# Patient Record
Sex: FEMALE | Race: WHITE | NOT HISPANIC OR LATINO | Employment: FULL TIME | URBAN - METROPOLITAN AREA
[De-identification: names, ages, dates, MRNs, and addresses within clinical notes are randomized per-mention and may not be internally consistent; named-entity substitution may affect disease eponyms.]

---

## 2017-01-16 RX ORDER — OMEPRAZOLE 40 MG/1
CAPSULE, DELAYED RELEASE ORAL
Qty: 90 CAP | Refills: 0 | Status: SHIPPED | OUTPATIENT
Start: 2017-01-16 | End: 2017-03-15 | Stop reason: SDUPTHER

## 2017-01-16 NOTE — TELEPHONE ENCOUNTER
Was the patient seen in the last year in this department? No    Does patient have an active prescription for medications requested? No     Received Request Via: Pharmacy

## 2017-03-15 ENCOUNTER — OFFICE VISIT (OUTPATIENT)
Dept: MEDICAL GROUP | Facility: CLINIC | Age: 32
End: 2017-03-15
Payer: COMMERCIAL

## 2017-03-15 VITALS
RESPIRATION RATE: 14 BRPM | WEIGHT: 144 LBS | BODY MASS INDEX: 21.33 KG/M2 | DIASTOLIC BLOOD PRESSURE: 60 MMHG | SYSTOLIC BLOOD PRESSURE: 98 MMHG | HEART RATE: 41 BPM | TEMPERATURE: 98.6 F | OXYGEN SATURATION: 100 % | HEIGHT: 69 IN

## 2017-03-15 DIAGNOSIS — F41.9 ANXIETY: ICD-10-CM

## 2017-03-15 DIAGNOSIS — R00.1 BRADYCARDIA, SINUS: ICD-10-CM

## 2017-03-15 DIAGNOSIS — M22.2X9 PATELLOFEMORAL PAIN SYNDROME, UNSPECIFIED LATERALITY: ICD-10-CM

## 2017-03-15 DIAGNOSIS — K21.9 GERD WITHOUT ESOPHAGITIS: ICD-10-CM

## 2017-03-15 PROCEDURE — 99214 OFFICE O/P EST MOD 30 MIN: CPT | Performed by: NURSE PRACTITIONER

## 2017-03-15 RX ORDER — OMEPRAZOLE 40 MG/1
40 CAPSULE, DELAYED RELEASE ORAL
Qty: 90 CAP | Refills: 3 | Status: SHIPPED | OUTPATIENT
Start: 2017-03-15 | End: 2018-05-08 | Stop reason: SDUPTHER

## 2017-03-15 RX ORDER — DIAZEPAM 5 MG/1
5 TABLET ORAL
Qty: 30 TAB | Refills: 0 | Status: SHIPPED
Start: 2017-03-15 | End: 2018-07-10 | Stop reason: SDUPTHER

## 2017-03-15 ASSESSMENT — PATIENT HEALTH QUESTIONNAIRE - PHQ9: CLINICAL INTERPRETATION OF PHQ2 SCORE: 0

## 2017-03-15 NOTE — PROGRESS NOTES
"CC: Follow-Up        HPI:     Gogo presents today for the followin. Anxiety  Patient will like refills of her diazepam. Uses extremely rarely. States she thinks the pill she has manic she be . 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     2. GERD without esophagitis  Needs refills of her omeprazole. Does not take it improvement with over-the-counter omeprazole    4. Patellofemoral pain syndrome, unspecified laterality  Like refills of the cough neck which uses intermittently.    Current Outpatient Prescriptions   Medication Sig Dispense Refill   • omeprazole (PRILOSEC) 40 MG delayed-release capsule Take 1 Cap by mouth every day. 90 Cap 3   • diazepam (VALIUM) 5 MG Tab Take 1 Tab by mouth at bedtime as needed for Sleep. 30 Tab 0   • Diclofenac Sodium (VOLTAREN) 1 % Gel Apply 4 g to affected area(s) 4 times a day as needed. 1 Tube 1   • propranolol (INDERAL) 20 MG Tab   5   • ibuprofen (MOTRIN) 600 MG Tab Take 600 mg by mouth every 6 hours as needed.       No current facility-administered medications for this visit.     Social History   Substance Use Topics   • Smoking status: Former Smoker   • Smokeless tobacco: Never Used   • Alcohol Use: 4.2 oz/week     7 Shots of liquor per week     I reviewed patients allergies, problem list and medications today in [facility].    ROS: Any/all pertinent positives listed in the HPI, otherwise all others reviewed are negative today.      BP 98/60 mmHg  Pulse 41  Temp(Src) 37 °C (98.6 °F)  Resp 14  Ht 1.753 m (5' 9.02\")  Wt 65.318 kg (144 lb)  BMI 21.26 kg/m2  SpO2 100%  LMP 2017  Breastfeeding? No    Exam  Gen: Alert and oriented, No apparent distress. WDWN  Psych: A+Ox3, normal affect and mood  Skin: Warm, dry and intact. Good turgor   No rashes in visible areas.  Eye: Conjunctiva clear, lids normal  ENMT: Lips without lesions, good dentition  Lungs: Clear to " auscultation bilaterally, no rales or rhonchi   Unlabored respiratory effort.   CV: Regular rate and rhythm, S1, S2. No murmurs.   No Edema resting heart rate in the 40s today. Patient is asymptomatic.         Assessment and Plan.   31 y.o. female with the following issues.    1. Anxiety   reviewed from state pharmacy database-Medications found to be medically necessary/appropriate.    - diazepam (VALIUM) 5 MG Tab; Take 1 Tab by mouth at bedtime as needed for Sleep.  Dispense: 30 Tab; Refill: 0    2. Bradycardia, sinus  Stable. Chronically intermittent She is asymptomatic. Patient is 1:30 to 50 miles each week. She states that she is prone to bradycardia since she has started this habit years previously. She states she will often monitor heart rate and get numbers similar to the 40s for her heart rate sometimes lower.  She's never had any sequelae related to this. 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 She verbalized understanding.    3. GERD without esophagitis  Stable. Continue medication, has refills  - omeprazole (PRILOSEC) 40 MG delayed-release capsule; Take 1 Cap by mouth every day.  Dispense: 90 Cap; Refill: 3    4. Patellofemoral pain syndrome, unspecified laterality  Stable. Continue medication, has refills  - Diclofenac Sodium (VOLTAREN) 1 % Gel; Apply 4 g to affected area(s) 4 times a day as needed.  Dispense: 1 Tube; Refill: 1

## 2017-11-06 ENCOUNTER — OFFICE VISIT (OUTPATIENT)
Dept: MEDICAL GROUP | Facility: CLINIC | Age: 32
End: 2017-11-06
Payer: COMMERCIAL

## 2017-11-06 VITALS
HEART RATE: 59 BPM | DIASTOLIC BLOOD PRESSURE: 64 MMHG | BODY MASS INDEX: 21.48 KG/M2 | SYSTOLIC BLOOD PRESSURE: 108 MMHG | HEIGHT: 69 IN | TEMPERATURE: 100 F | RESPIRATION RATE: 14 BRPM | OXYGEN SATURATION: 99 % | WEIGHT: 145 LBS

## 2017-11-06 DIAGNOSIS — F41.8 PERFORMANCE ANXIETY: ICD-10-CM

## 2017-11-06 DIAGNOSIS — K21.9 GERD WITHOUT ESOPHAGITIS: ICD-10-CM

## 2017-11-06 DIAGNOSIS — Z11.59 ENCOUNTER FOR HEPATITIS C SCREENING TEST FOR LOW RISK PATIENT: ICD-10-CM

## 2017-11-06 DIAGNOSIS — Z13.220 SCREENING, LIPID: ICD-10-CM

## 2017-11-06 DIAGNOSIS — Z00.00 ROUTINE GENERAL MEDICAL EXAMINATION AT A HEALTH CARE FACILITY: ICD-10-CM

## 2017-11-06 PROCEDURE — 99395 PREV VISIT EST AGE 18-39: CPT | Performed by: NURSE PRACTITIONER

## 2017-11-06 RX ORDER — PROPRANOLOL HYDROCHLORIDE 20 MG/1
20 TABLET ORAL
Qty: 60 TAB | Refills: 0 | Status: SHIPPED | OUTPATIENT
Start: 2017-11-06 | End: 2018-07-09 | Stop reason: SDUPTHER

## 2017-11-07 NOTE — PROGRESS NOTES
"CC: Annual Exam (Without PAP; )        HPI:     Gogo presents today for the followin. Routine general medical examination at a health care facility  Here for routine physical. No complaints. 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 Performance anxiety  She does do best if she has propranolol to use as needed for speech is in presentations. We had held this at the last appointment because her heart rate was in the 40s. She's never had any problems with orthostatic hypotension, sinus bradycardia or syncope. She like to have medication prescribed again is able    3. GERD without esophagitis  Compliant with omeprazole. She is trying to wean off of it. She does not feel she is at risk for H. pylori        Current Outpatient Prescriptions   Medication Sig Dispense Refill   • propranolol (INDERAL) 20 MG Tab Take 1 Tab by mouth 1 time daily as needed. 60 Tab 0   • omeprazole (PRILOSEC) 40 MG delayed-release capsule Take 1 Cap by mouth every day. 90 Cap 3   • diazepam (VALIUM) 5 MG Tab Take 1 Tab by mouth at bedtime as needed for Sleep. 30 Tab 0   • ibuprofen (MOTRIN) 600 MG Tab Take 600 mg by mouth every 6 hours as needed.       No current facility-administered medications for this visit.      Social History   Substance Use Topics   • Smoking status: Former Smoker   • Smokeless tobacco: Never Used   • Alcohol use 3.0 - 4.2 oz/week     5 - 7 Shots of liquor per week      Comment: Socially, at most 5-7 weekly     I reviewed patients allergies, problem list and medications today in [facility].    ROS: Any/all pertinent positives listed in the HPI, otherwise all others reviewed are negative today.      /64   Pulse (!) 59   Temp 37.8 °C (100 °F)   Resp 14   Ht 1.753 m (5' 9\")   Wt 65.8 kg (145 lb)   LMP 2017   SpO2 99%   Breastfeeding? No   BMI 21.41 kg/m²     Exam:   Gen: Alert and oriented, No apparent distress. WDWN  Psych: A+Ox3, normal affect and mood  Skin: " Warm, dry and intact. Good turgor   No rashes in visible areas.  Eye: Conjunctiva clear, lids normal  ENMT: Lips without lesions, good dentition  Neck: No Lymphadenopathy, Thyromegaly, Bruits.   Trachea midline, no masses  Lungs: Clear to auscultation bilaterally, no rales or rhonchi   Unlabored respiratory effort.   CV: Regular rate and rhythm, S1, S2. No murmurs.   No Edema        Assessment and Plan.   32 y.o. female with the following issues.    1. Routine general medical examination at a health care facility  Stable routine health normal exam. She is not due for Pap smear until 4376-2437. Stable monogamous relationship    2. Performance anxiety  Stable. Continue medication use as needed   - propranolol (INDERAL) 20 MG Tab; Take 1 Tab by mouth 1 time daily as needed.  Dispense: 60 Tab; Refill: 0  - TSH; Future    3. GERD without esophagitis  Stable. Continue omeprazole as needed  - COMP METABOLIC PANEL; Future  - CBC WITH DIFFERENTIAL; Future  - MAGNESIUM; Future    4. Screening, lipid  Ordered  - LIPID PROFILE; Future    5. Encounter for hepatitis C screening test for low risk patient  Ordered  - HEP C VIRUS ANTIBODY; Future

## 2017-11-09 ENCOUNTER — HOSPITAL ENCOUNTER (OUTPATIENT)
Dept: LAB | Facility: MEDICAL CENTER | Age: 32
End: 2017-11-09
Attending: NURSE PRACTITIONER
Payer: COMMERCIAL

## 2017-11-09 DIAGNOSIS — Z11.59 ENCOUNTER FOR HEPATITIS C SCREENING TEST FOR LOW RISK PATIENT: ICD-10-CM

## 2017-11-09 DIAGNOSIS — K21.9 GERD WITHOUT ESOPHAGITIS: ICD-10-CM

## 2017-11-09 DIAGNOSIS — Z13.220 SCREENING, LIPID: ICD-10-CM

## 2017-11-09 DIAGNOSIS — F41.8 PERFORMANCE ANXIETY: ICD-10-CM

## 2017-11-09 LAB
ALBUMIN SERPL BCP-MCNC: 4.5 G/DL (ref 3.2–4.9)
ALBUMIN/GLOB SERPL: 1.7 G/DL
ALP SERPL-CCNC: 32 U/L (ref 30–99)
ALT SERPL-CCNC: 12 U/L (ref 2–50)
ANION GAP SERPL CALC-SCNC: 8 MMOL/L (ref 0–11.9)
AST SERPL-CCNC: 21 U/L (ref 12–45)
BASOPHILS # BLD AUTO: 1.3 % (ref 0–1.8)
BASOPHILS # BLD: 0.07 K/UL (ref 0–0.12)
BILIRUB SERPL-MCNC: 0.5 MG/DL (ref 0.1–1.5)
BUN SERPL-MCNC: 12 MG/DL (ref 8–22)
CALCIUM SERPL-MCNC: 9.2 MG/DL (ref 8.5–10.5)
CHLORIDE SERPL-SCNC: 106 MMOL/L (ref 96–112)
CHOLEST SERPL-MCNC: 177 MG/DL (ref 100–199)
CO2 SERPL-SCNC: 23 MMOL/L (ref 20–33)
CREAT SERPL-MCNC: 0.71 MG/DL (ref 0.5–1.4)
EOSINOPHIL # BLD AUTO: 0.11 K/UL (ref 0–0.51)
EOSINOPHIL NFR BLD: 2 % (ref 0–6.9)
ERYTHROCYTE [DISTWIDTH] IN BLOOD BY AUTOMATED COUNT: 49.8 FL (ref 35.9–50)
GFR SERPL CREATININE-BSD FRML MDRD: >60 ML/MIN/1.73 M 2
GLOBULIN SER CALC-MCNC: 2.7 G/DL (ref 1.9–3.5)
GLUCOSE SERPL-MCNC: 87 MG/DL (ref 65–99)
HCT VFR BLD AUTO: 41 % (ref 37–47)
HCV AB SER QL: NEGATIVE
HDLC SERPL-MCNC: 78 MG/DL
HGB BLD-MCNC: 13.3 G/DL (ref 12–16)
IMM GRANULOCYTES # BLD AUTO: 0.01 K/UL (ref 0–0.11)
IMM GRANULOCYTES NFR BLD AUTO: 0.2 % (ref 0–0.9)
LDLC SERPL CALC-MCNC: 93 MG/DL
LYMPHOCYTES # BLD AUTO: 1.8 K/UL (ref 1–4.8)
LYMPHOCYTES NFR BLD: 33 % (ref 22–41)
MAGNESIUM SERPL-MCNC: 2.2 MG/DL (ref 1.5–2.5)
MCH RBC QN AUTO: 28.8 PG (ref 27–33)
MCHC RBC AUTO-ENTMCNC: 32.4 G/DL (ref 33.6–35)
MCV RBC AUTO: 88.7 FL (ref 81.4–97.8)
MONOCYTES # BLD AUTO: 0.38 K/UL (ref 0–0.85)
MONOCYTES NFR BLD AUTO: 7 % (ref 0–13.4)
NEUTROPHILS # BLD AUTO: 3.08 K/UL (ref 2–7.15)
NEUTROPHILS NFR BLD: 56.5 % (ref 44–72)
NRBC # BLD AUTO: 0 K/UL
NRBC BLD AUTO-RTO: 0 /100 WBC
PLATELET # BLD AUTO: 248 K/UL (ref 164–446)
PMV BLD AUTO: 11.8 FL (ref 9–12.9)
POTASSIUM SERPL-SCNC: 3.7 MMOL/L (ref 3.6–5.5)
PROT SERPL-MCNC: 7.2 G/DL (ref 6–8.2)
RBC # BLD AUTO: 4.62 M/UL (ref 4.2–5.4)
SODIUM SERPL-SCNC: 137 MMOL/L (ref 135–145)
TRIGL SERPL-MCNC: 31 MG/DL (ref 0–149)
TSH SERPL DL<=0.005 MIU/L-ACNC: 1.82 UIU/ML (ref 0.3–3.7)
WBC # BLD AUTO: 5.5 K/UL (ref 4.8–10.8)

## 2017-11-09 PROCEDURE — 84443 ASSAY THYROID STIM HORMONE: CPT

## 2017-11-09 PROCEDURE — 85025 COMPLETE CBC W/AUTO DIFF WBC: CPT

## 2017-11-09 PROCEDURE — 80061 LIPID PANEL: CPT

## 2017-11-09 PROCEDURE — 86803 HEPATITIS C AB TEST: CPT

## 2017-11-09 PROCEDURE — 36415 COLL VENOUS BLD VENIPUNCTURE: CPT

## 2017-11-09 PROCEDURE — 83735 ASSAY OF MAGNESIUM: CPT

## 2017-11-09 PROCEDURE — 80053 COMPREHEN METABOLIC PANEL: CPT

## 2018-05-08 DIAGNOSIS — K21.9 GERD WITHOUT ESOPHAGITIS: ICD-10-CM

## 2018-05-08 RX ORDER — OMEPRAZOLE 40 MG/1
CAPSULE, DELAYED RELEASE ORAL
Qty: 90 CAP | Refills: 3 | Status: SHIPPED | OUTPATIENT
Start: 2018-05-08 | End: 2018-08-16 | Stop reason: SDUPTHER

## 2018-07-09 DIAGNOSIS — F41.9 ANXIETY: ICD-10-CM

## 2018-07-09 DIAGNOSIS — F41.8 PERFORMANCE ANXIETY: ICD-10-CM

## 2018-07-10 ENCOUNTER — OFFICE VISIT (OUTPATIENT)
Dept: MEDICAL GROUP | Facility: CLINIC | Age: 33
End: 2018-07-10
Payer: COMMERCIAL

## 2018-07-10 VITALS
TEMPERATURE: 99 F | SYSTOLIC BLOOD PRESSURE: 94 MMHG | HEART RATE: 49 BPM | WEIGHT: 146 LBS | BODY MASS INDEX: 21.62 KG/M2 | RESPIRATION RATE: 14 BRPM | HEIGHT: 69 IN | OXYGEN SATURATION: 98 % | DIASTOLIC BLOOD PRESSURE: 62 MMHG

## 2018-07-10 DIAGNOSIS — G47.00 INSOMNIA, UNSPECIFIED TYPE: ICD-10-CM

## 2018-07-10 DIAGNOSIS — Z23 NEED FOR VACCINATION: ICD-10-CM

## 2018-07-10 DIAGNOSIS — F41.9 ANXIETY: ICD-10-CM

## 2018-07-10 DIAGNOSIS — F40.243 FEAR OF FLYING: ICD-10-CM

## 2018-07-10 PROCEDURE — 90471 IMMUNIZATION ADMIN: CPT | Performed by: NURSE PRACTITIONER

## 2018-07-10 PROCEDURE — 99214 OFFICE O/P EST MOD 30 MIN: CPT | Mod: 25 | Performed by: NURSE PRACTITIONER

## 2018-07-10 PROCEDURE — 90715 TDAP VACCINE 7 YRS/> IM: CPT | Performed by: NURSE PRACTITIONER

## 2018-07-10 RX ORDER — PROPRANOLOL HYDROCHLORIDE 20 MG/1
20 TABLET ORAL
Qty: 60 TAB | Refills: 0 | Status: SHIPPED | OUTPATIENT
Start: 2018-07-10

## 2018-07-10 RX ORDER — DIAZEPAM 5 MG/1
5 TABLET ORAL
Qty: 30 TAB | Refills: 0 | OUTPATIENT
Start: 2018-07-10

## 2018-07-10 RX ORDER — DIAZEPAM 5 MG/1
5 TABLET ORAL
Qty: 30 TAB | Refills: 0 | Status: SHIPPED | OUTPATIENT
Start: 2018-07-10 | End: 2019-07-01 | Stop reason: SDUPTHER

## 2018-07-10 ASSESSMENT — PATIENT HEALTH QUESTIONNAIRE - PHQ9: CLINICAL INTERPRETATION OF PHQ2 SCORE: 0

## 2018-07-10 NOTE — PROGRESS NOTES
"CC: No chief complaint on file.        HPI:     Gogo presents today for the followin. Anxiety/Fear of flying/ Insomnia, unspecified type  Here for refills of diazepam which she uses very rarely usually for flying phobias and inability to sleep.  She has no adverse effect to it.  She uses less than 30 pills a year.    4. Need for vaccination  Needs a tetanus    Current Outpatient Prescriptions   Medication Sig Dispense Refill   • diazePAM (VALIUM) 5 MG Tab Take 1 Tab by mouth at bedtime as needed for Sleep (flying) for up to 30 days. 30 Tab 0   • propranolol (INDERAL) 20 MG Tab Take 1 Tab by mouth 1 time daily as needed. 60 Tab 0   • omeprazole (PRILOSEC) 40 MG delayed-release capsule TAKE ONE CAPSULE BY MOUTH EVERY DAY 90 Cap 3   • ibuprofen (MOTRIN) 600 MG Tab Take 600 mg by mouth every 6 hours as needed.       No current facility-administered medications for this visit.      Social History   Substance Use Topics   • Smoking status: Former Smoker   • Smokeless tobacco: Never Used   • Alcohol use 3.0 - 4.2 oz/week     5 - 7 Shots of liquor per week      Comment: Socially, at most 5-7 weekly     I reviewed patients allergies, problem list and medications today in [facility].    ROS: Any/all pertinent positives listed in the HPI, otherwise all others reviewed are negative today.      BP (!) 94/62   Pulse (!) 49   Temp 37.2 °C (99 °F)   Resp 14   Ht 1.753 m (5' 9\")   Wt 66.2 kg (146 lb) Comment: Pt weighed at home  SpO2 98%   Breastfeeding? No   BMI 21.56 kg/m²     Exam:    Gen: Alert and oriented, No apparent distress. WDWN  Psych: A+Ox3, normal affect and mood  Skin: Warm, dry and intact. Good turgor   No rashes in visible areas.  Eye: Conjunctiva clear, lids normal  ENMT: Lips without lesions, good dentition  Lungs: Clear to auscultation bilaterally, no rales or rhonchi   Unlabored respiratory effort.   CV: Regular rate and rhythm, S1, S2. No murmurs.  Chronically bradycardic   No Edema        Assessment " and Plan.   33 y.o. female with the following issues.    1. Anxiety/ Fear of flying/ Insomnia, unspecified type   reviewed from state pharmacy database-Medications found to be medically necessary/appropriate.  Printed prescription was given patient to carry into her pharmacy per her Preference  - diazePAM (VALIUM) 5 MG Tab; Take 1 Tab by mouth at bedtime as needed for Sleep (flying) for up to 30 days.  Dispense: 30 Tab; Refill: 0    4. Need for vaccination  I have placed the below orders and discussed them with an approved delegating provider. The MA is performing the below orders under the direction of an office MD.  -Given today.  - TDAP VACCINE =>6YO IM

## 2018-07-10 NOTE — TELEPHONE ENCOUNTER
Refills of propranolol sent.  Please notify patient I am unable to refill her Valium without an appointment in the office due to new policies.  Please may have her make an appointment for this refill.

## 2018-07-10 NOTE — LETTER
July 10, 2018         Patient: Gogo Wong   YOB: 1985   Date of Visit: 7/10/2018           To Whom it May Concern:    Gogo Wong was seen in my clinic on 7/10/2018. She has an active prescription to use valium as need for insomnia and/or flying phobias.     If you have any questions or concerns, please don't hesitate to call.        Sincerely,           MARCOS Red  Electronically Signed

## 2018-08-16 DIAGNOSIS — K21.9 GERD WITHOUT ESOPHAGITIS: ICD-10-CM

## 2018-08-16 RX ORDER — OMEPRAZOLE 40 MG/1
40 CAPSULE, DELAYED RELEASE ORAL
Qty: 90 CAP | Refills: 3 | Status: SHIPPED | OUTPATIENT
Start: 2018-08-16 | End: 2019-01-28 | Stop reason: SDUPTHER

## 2019-01-28 ENCOUNTER — OFFICE VISIT (OUTPATIENT)
Dept: MEDICAL GROUP | Facility: MEDICAL CENTER | Age: 34
End: 2019-01-28
Payer: COMMERCIAL

## 2019-01-28 VITALS
HEART RATE: 58 BPM | DIASTOLIC BLOOD PRESSURE: 58 MMHG | RESPIRATION RATE: 14 BRPM | TEMPERATURE: 98.3 F | SYSTOLIC BLOOD PRESSURE: 98 MMHG | BODY MASS INDEX: 22.66 KG/M2 | OXYGEN SATURATION: 98 % | HEIGHT: 69 IN | WEIGHT: 153 LBS

## 2019-01-28 DIAGNOSIS — N93.9 ABNORMAL VAGINAL BLEEDING: ICD-10-CM

## 2019-01-28 DIAGNOSIS — K21.9 GASTROESOPHAGEAL REFLUX DISEASE, ESOPHAGITIS PRESENCE NOT SPECIFIED: ICD-10-CM

## 2019-01-28 DIAGNOSIS — Z13.21 ENCOUNTER FOR VITAMIN DEFICIENCY SCREENING: ICD-10-CM

## 2019-01-28 PROCEDURE — 99214 OFFICE O/P EST MOD 30 MIN: CPT | Performed by: NURSE PRACTITIONER

## 2019-01-28 RX ORDER — OMEPRAZOLE 40 MG/1
40 CAPSULE, DELAYED RELEASE ORAL
Qty: 90 CAP | Refills: 3 | Status: SHIPPED | OUTPATIENT
Start: 2019-01-28 | End: 2019-09-03

## 2019-01-28 ASSESSMENT — PATIENT HEALTH QUESTIONNAIRE - PHQ9: CLINICAL INTERPRETATION OF PHQ2 SCORE: 0

## 2019-01-28 NOTE — PROGRESS NOTES
CC: Menstrual Problem (abnormal uterine bleeding on and off for a year)        HPI:     Gogo presents today for the followin. Abnormal vaginal bleeding  Here today stating for approximately last 6 months she has had some midcycle brown spotting.  States approximately for the last 6 months however not quite sure.  States during this time.  She will still have her normal menstruation which she describes as lasting 3-5 days, not particularly heavy, uses a tampon maybe every 8 hours.  On the heavy days will be a super tampon, normal days of be a regular tampon.  Has some associated cramps, nothing particularly bothersome.  She states however during the last 6 months she will spot again around what she describes being her ovulation time with her menstrual tracker.  She describes this as being brown.  May be was brought her underwear but is very light.  May have some small associated cramps.    She states this last month however she had a normal menstruation on the seventh, continue to spot for a few days and then had what she describes as heavier spotting for several more days.  Has not had any bleeding for 2 days.    Denies any new medications or diagnoses.    She denies pregnancy.  Birth control method currently is withdrawal.  Monogamous relationship for 15 years.  Normal Pap about 3 years ago -non-smoker.  No associated pelvic pain or other symptoms.    States her paternal grandmother took JIMI and so her paternal aunt did have some kind of cancer or associated issues.    2. Gastroesophageal reflux disease, esophagitis presence not specified  Would like refill of omeprazole    Current Outpatient Prescriptions   Medication Sig Dispense Refill   • omeprazole (PRILOSEC) 40 MG delayed-release capsule Take 1 Cap by mouth every day. 90 Cap 3   • propranolol (INDERAL) 20 MG Tab Take 1 Tab by mouth 1 time daily as needed. 60 Tab 0   • ibuprofen (MOTRIN) 600 MG Tab Take 600 mg by mouth every 6 hours as needed.       No  "current facility-administered medications for this visit.      Social History   Substance Use Topics   • Smoking status: Former Smoker   • Smokeless tobacco: Never Used   • Alcohol use 3.0 - 4.2 oz/week     5 - 7 Shots of liquor per week      Comment: Socially, at most 5-7 weekly     I reviewed patients allergies, problem list and medications today in [facility].    ROS: Any/all pertinent positives listed in the HPI, otherwise all others reviewed are negative today.      BP (!) 98/58 (BP Location: Right arm, Patient Position: Sitting, BP Cuff Size: Adult)   Pulse (!) 58   Temp 36.8 °C (98.3 °F) (Temporal)   Resp 14   Ht 1.753 m (5' 9\")   Wt 69.4 kg (153 lb) Comment: weighted in room by provider  LMP 01/07/2019   SpO2 98%   BMI 22.59 kg/m²      Exam:    Gen: Alert and oriented, No apparent distress. WDWN  Psych: A+Ox3, normal affect and mood  Skin: Warm, dry and intact. Good turgor   No rashes in visible areas.  Eye: Conjunctiva clear, lids normal  ENMT: Lips without lesions, good dentition  Neck: No Lymphadenopathy, Thyromegaly, Bruits.   Trachea midline, no masses  Lungs: Clear to auscultation bilaterally, no rales or rhonchi   Unlabored respiratory effort.   CV: Regular rate and rhythm, S1, S2. No murmurs.   No Edema      Assessment and Plan.   33 y.o. female with the following issues.    1. Abnormal vaginal bleeding  Clinically stable not currently bleeding.  I do recommend she take a multivitamin that has a small amount of iron daily.  We will check some labs below including a CBC and a thyroid.  Not currently due for Pap and has no risk factors for abnormal Pap or sexually transmitted infections.  GYN ultrasound ordered.  Discussed if it continues but all tests are negative we could consider using a few months of hormonal contraceptive to see if it resolves or he can always place referral to gynecology.  - US-PELVIC TRANSVAGINAL ONLY; Future  - COMP METABOLIC PANEL; Future  - CBC WITH DIFFERENTIAL; Future  - " TSH; Future  -Beta quant    2. Encounter for vitamin deficiency screening  Does take over-the-counter vitamin D  - VITAMIN D,25 HYDROXY; Future    3. Gastroesophageal reflux disease, esophagitis presence not specified  Refill sent  - omeprazole (PRILOSEC) 40 MG delayed-release capsule; Take 1 Cap by mouth every day.  Dispense: 90 Cap; Refill: 3

## 2019-02-05 ENCOUNTER — HOSPITAL ENCOUNTER (OUTPATIENT)
Dept: RADIOLOGY | Facility: MEDICAL CENTER | Age: 34
End: 2019-02-05
Attending: NURSE PRACTITIONER
Payer: COMMERCIAL

## 2019-02-05 DIAGNOSIS — N93.9 ABNORMAL VAGINAL BLEEDING: ICD-10-CM

## 2019-02-05 PROCEDURE — 76830 TRANSVAGINAL US NON-OB: CPT

## 2019-07-01 ENCOUNTER — OFFICE VISIT (OUTPATIENT)
Dept: MEDICAL GROUP | Facility: MEDICAL CENTER | Age: 34
End: 2019-07-01
Payer: COMMERCIAL

## 2019-07-01 VITALS
HEART RATE: 58 BPM | DIASTOLIC BLOOD PRESSURE: 80 MMHG | TEMPERATURE: 97.8 F | WEIGHT: 142.4 LBS | BODY MASS INDEX: 21.09 KG/M2 | HEIGHT: 69 IN | OXYGEN SATURATION: 98 % | RESPIRATION RATE: 14 BRPM | SYSTOLIC BLOOD PRESSURE: 94 MMHG

## 2019-07-01 DIAGNOSIS — K21.9 GASTROESOPHAGEAL REFLUX DISEASE, ESOPHAGITIS PRESENCE NOT SPECIFIED: ICD-10-CM

## 2019-07-01 DIAGNOSIS — F41.8 PERFORMANCE ANXIETY: ICD-10-CM

## 2019-07-01 DIAGNOSIS — M22.2X9 PATELLOFEMORAL STRESS SYNDROME, UNSPECIFIED LATERALITY: ICD-10-CM

## 2019-07-01 DIAGNOSIS — F40.243 FEAR OF FLYING: ICD-10-CM

## 2019-07-01 DIAGNOSIS — G47.00 INSOMNIA, UNSPECIFIED TYPE: ICD-10-CM

## 2019-07-01 DIAGNOSIS — R00.1 BRADYCARDIA, SINUS: ICD-10-CM

## 2019-07-01 DIAGNOSIS — F41.9 ANXIETY: ICD-10-CM

## 2019-07-01 DIAGNOSIS — N92.6 ABNORMAL MENSES: ICD-10-CM

## 2019-07-01 PROCEDURE — 99214 OFFICE O/P EST MOD 30 MIN: CPT | Performed by: NURSE PRACTITIONER

## 2019-07-01 RX ORDER — DIAZEPAM 5 MG/1
5 TABLET ORAL
Qty: 30 TAB | Refills: 0 | Status: SHIPPED
Start: 2019-07-01 | End: 2019-07-31

## 2019-07-01 ASSESSMENT — PAIN SCALES - GENERAL: PAINLEVEL: NO PAIN

## 2019-07-01 NOTE — PROGRESS NOTES
CC: No chief complaint on file.        HPI:     Gogo presents today for the followin. Bradycardia, sinus  Asx.  Runs 20+ miles several times a week    2. Patellofemoral stress syndrome, unspecified laterality  resolved    3. Gastroesophageal reflux disease, esophagitis presence not specified  Still on 40mg prilosec daily.  Has tried for 1 week at a time to wean down with zantac for breakthrough but then ends up having to go back on the PPI.  She does take a multivitamin and vitamin D supplement    4. Abnormal menses  Resolved after her pelvic ultrasound that was normal.  She states menses currently every 2830 days and last 3 to 5 days at a time.  No current birth control method-withdrawal.    5. Performance anxiety  Still uses propranolol as needed for speech anxiety    6. Fear of flying/. Anxiety/. Insomnia, unspecified type  Requesting refills of diazepam.  She uses this for flying.  Last filled 1 year ago.    Current Outpatient Prescriptions   Medication Sig Dispense Refill   • diazePAM (VALIUM) 5 MG Tab Take 1 Tab by mouth at bedtime as needed for Sleep (flying) for up to 30 days. 30 Tab 0   • omeprazole (PRILOSEC) 40 MG delayed-release capsule Take 1 Cap by mouth every day. 90 Cap 3   • propranolol (INDERAL) 20 MG Tab Take 1 Tab by mouth 1 time daily as needed. 60 Tab 0   • ibuprofen (MOTRIN) 600 MG Tab Take 600 mg by mouth every 6 hours as needed.       No current facility-administered medications for this visit.      Social History   Substance Use Topics   • Smoking status: Former Smoker   • Smokeless tobacco: Never Used   • Alcohol use 3.0 - 4.2 oz/week     5 - 7 Shots of liquor per week      Comment: Socially, at most 5-7 weekly     I reviewed patients allergies, problem list and medications today in [facility].    ROS: Any/all pertinent positives listed in the HPI, otherwise all others reviewed are negative today.    Vitals:    19 1700   BP: (!) 94/80   Pulse: (!) 58   Resp: 14   Temp: 36.6 °C (97.8  °F)   SpO2: 98%           Exam:    Gen: Alert and oriented, No apparent distress. WDWN  Psych: A+Ox3, normal affect and mood  Skin: Warm, dry and intact. Good turgor   No rashes in visible areas.  Eye: Conjunctiva clear, lids normal  ENMT: Lips without lesions, good dentition  Neck: No Lymphadenopathy, Thyromegaly, Bruits.   Trachea midline, no masses  Lungs: Clear to auscultation bilaterally, no rales or rhonchi   Unlabored respiratory effort.   CV: Regular rate and rhythm, S1, S2. No murmurs.   No Edema  Ext: No clubbing, cyanosis, edema.       Assessment and Plan.   34 y.o. female with the following issues.    1. Bradycardia, sinus  Currently mild and asymptomatic.  Likely related to her long distance running    2. Patellofemoral stress syndrome, unspecified laterality  Resolved    3. Gastroesophageal reflux disease, esophagitis presence not specified  We discussed tapering and weaning if desired.  We discussed given she is been on this for 15 years she would likely require a slow long taper.    4. Abnormal menses  Resolved    5. Performance anxiety  Has propranolol as needed.  We are cautious about her bradycardia which is been unaffected by PRN propranolol use in the past    6. Fear of flying/Anxiety/ Insomnia, unspecified type   reviewed from state pharmacy database-Medications found to be medically necessary/appropriate.  Refill sent  - diazePAM (VALIUM) 5 MG Tab; Take 1 Tab by mouth at bedtime as needed for Sleep (flying) for up to 30 days.  Dispense: 30 Tab; Refill: 0        Interested in being tested for B allergy.  Patient will let me know if she would like an allergy referral.

## 2019-09-03 ENCOUNTER — HOSPITAL ENCOUNTER (OUTPATIENT)
Dept: RADIOLOGY | Facility: MEDICAL CENTER | Age: 34
End: 2019-09-03
Attending: NURSE PRACTITIONER
Payer: COMMERCIAL

## 2019-09-03 ENCOUNTER — HOSPITAL ENCOUNTER (OUTPATIENT)
Dept: LAB | Facility: MEDICAL CENTER | Age: 34
End: 2019-09-03
Attending: NURSE PRACTITIONER
Payer: COMMERCIAL

## 2019-09-03 ENCOUNTER — OFFICE VISIT (OUTPATIENT)
Dept: MEDICAL GROUP | Facility: MEDICAL CENTER | Age: 34
End: 2019-09-03
Payer: COMMERCIAL

## 2019-09-03 VITALS
WEIGHT: 150.8 LBS | SYSTOLIC BLOOD PRESSURE: 98 MMHG | HEART RATE: 44 BPM | TEMPERATURE: 98.7 F | HEIGHT: 69 IN | RESPIRATION RATE: 14 BRPM | OXYGEN SATURATION: 99 % | DIASTOLIC BLOOD PRESSURE: 60 MMHG | BODY MASS INDEX: 22.33 KG/M2

## 2019-09-03 DIAGNOSIS — R10.11 RUQ PAIN: ICD-10-CM

## 2019-09-03 DIAGNOSIS — R10.13 POSTPRANDIAL EPIGASTRIC PAIN: ICD-10-CM

## 2019-09-03 LAB
ALBUMIN SERPL BCP-MCNC: 4.5 G/DL (ref 3.2–4.9)
ALBUMIN/GLOB SERPL: 1.6 G/DL
ALP SERPL-CCNC: 39 U/L (ref 30–99)
ALT SERPL-CCNC: 14 U/L (ref 2–50)
AMYLASE SERPL-CCNC: 73 U/L (ref 20–103)
ANION GAP SERPL CALC-SCNC: 8 MMOL/L (ref 0–11.9)
AST SERPL-CCNC: 18 U/L (ref 12–45)
B-HCG SERPL-ACNC: <0.6 MIU/ML (ref 0–5)
BASOPHILS # BLD AUTO: 0.5 % (ref 0–1.8)
BASOPHILS # BLD: 0.03 K/UL (ref 0–0.12)
BILIRUB SERPL-MCNC: 0.4 MG/DL (ref 0.1–1.5)
BUN SERPL-MCNC: 10 MG/DL (ref 8–22)
CALCIUM SERPL-MCNC: 9.4 MG/DL (ref 8.5–10.5)
CHLORIDE SERPL-SCNC: 105 MMOL/L (ref 96–112)
CO2 SERPL-SCNC: 27 MMOL/L (ref 20–33)
CREAT SERPL-MCNC: 0.76 MG/DL (ref 0.5–1.4)
EOSINOPHIL # BLD AUTO: 0.1 K/UL (ref 0–0.51)
EOSINOPHIL NFR BLD: 1.6 % (ref 0–6.9)
ERYTHROCYTE [DISTWIDTH] IN BLOOD BY AUTOMATED COUNT: 49.1 FL (ref 35.9–50)
FASTING STATUS PATIENT QL REPORTED: NORMAL
GLOBULIN SER CALC-MCNC: 2.9 G/DL (ref 1.9–3.5)
GLUCOSE SERPL-MCNC: 89 MG/DL (ref 65–99)
HCT VFR BLD AUTO: 41.2 % (ref 37–47)
HGB BLD-MCNC: 13 G/DL (ref 12–16)
IMM GRANULOCYTES # BLD AUTO: 0.01 K/UL (ref 0–0.11)
IMM GRANULOCYTES NFR BLD AUTO: 0.2 % (ref 0–0.9)
LIPASE SERPL-CCNC: 18 U/L (ref 11–82)
LYMPHOCYTES # BLD AUTO: 1.87 K/UL (ref 1–4.8)
LYMPHOCYTES NFR BLD: 30.8 % (ref 22–41)
MCH RBC QN AUTO: 29.1 PG (ref 27–33)
MCHC RBC AUTO-ENTMCNC: 31.6 G/DL (ref 33.6–35)
MCV RBC AUTO: 92.4 FL (ref 81.4–97.8)
MONOCYTES # BLD AUTO: 0.27 K/UL (ref 0–0.85)
MONOCYTES NFR BLD AUTO: 4.4 % (ref 0–13.4)
NEUTROPHILS # BLD AUTO: 3.79 K/UL (ref 2–7.15)
NEUTROPHILS NFR BLD: 62.5 % (ref 44–72)
NRBC # BLD AUTO: 0 K/UL
NRBC BLD-RTO: 0 /100 WBC
PLATELET # BLD AUTO: 238 K/UL (ref 164–446)
PMV BLD AUTO: 11.4 FL (ref 9–12.9)
POTASSIUM SERPL-SCNC: 3.7 MMOL/L (ref 3.6–5.5)
PROT SERPL-MCNC: 7.4 G/DL (ref 6–8.2)
RBC # BLD AUTO: 4.46 M/UL (ref 4.2–5.4)
SODIUM SERPL-SCNC: 140 MMOL/L (ref 135–145)
WBC # BLD AUTO: 6.1 K/UL (ref 4.8–10.8)

## 2019-09-03 PROCEDURE — 84702 CHORIONIC GONADOTROPIN TEST: CPT

## 2019-09-03 PROCEDURE — 36415 COLL VENOUS BLD VENIPUNCTURE: CPT

## 2019-09-03 PROCEDURE — 82150 ASSAY OF AMYLASE: CPT

## 2019-09-03 PROCEDURE — 76700 US EXAM ABDOM COMPLETE: CPT

## 2019-09-03 PROCEDURE — 80053 COMPREHEN METABOLIC PANEL: CPT

## 2019-09-03 PROCEDURE — 99214 OFFICE O/P EST MOD 30 MIN: CPT | Performed by: NURSE PRACTITIONER

## 2019-09-03 PROCEDURE — 83690 ASSAY OF LIPASE: CPT

## 2019-09-03 PROCEDURE — 85025 COMPLETE CBC W/AUTO DIFF WBC: CPT

## 2019-09-03 RX ORDER — RANITIDINE 150 MG/1
TABLET ORAL
COMMUNITY
End: 2022-06-01

## 2019-09-03 RX ORDER — OMEPRAZOLE 40 MG/1
CAPSULE, DELAYED RELEASE ORAL
COMMUNITY
Start: 2017-11-06 | End: 2020-02-03

## 2019-09-03 NOTE — PROGRESS NOTES
"CC: Epigastric Pain (x Friday; general weakness, pain in back)        HPI:     Gogo presents today for the followin. RUQ pain/ Postprandial epigastric pain  Here today stating on Friday she had some epigastric radiating to the right upper quadrant pain. this was the worst in terms of pain level yesterday and has improved starting today.  No associated nausea vomiting.  No associated fevers no black or bloody stools, diarrhea or constipation.  Stools are normal and frequency for her may be a \"little bit looser \"and a l \"ittle bit yellow\".  Has had a slightly decreased appetite  She states yesterday when the pain was at its worse pushing on certain parts of her stomach would cause pain to radiate to the right upper quadrant.  Equally if she went over speed bump it would be painful.  Pain would radiate posteriorly to her back.  She does notice that the discomfort is worst after meals  Has had associated bloating.  Feels like her weight is gone up from her baseline of 142 at home    No different medications and she maintains to be on her omeprazole 40 mg    Just finished her menstruation, normal.  Does not feel that she is pregnant    Current Outpatient Medications   Medication Sig Dispense Refill   • omeprazole (PRILOSEC) 40 MG delayed-release capsule OMEPRAZOLE 40 MG CPDR     • raNITidine (ZANTAC) 150 MG Tab RANITIDINE HCL TABS     • propranolol (INDERAL) 20 MG Tab Take 1 Tab by mouth 1 time daily as needed. 60 Tab 0   • ibuprofen (MOTRIN) 600 MG Tab Take 600 mg by mouth every 6 hours as needed.       No current facility-administered medications for this visit.      Social History     Tobacco Use   • Smoking status: Former Smoker   • Smokeless tobacco: Never Used   Substance Use Topics   • Alcohol use: Yes     Alcohol/week: 3.0 - 4.2 oz     Types: 5 - 7 Shots of liquor per week     Comment: Socially, at most 5-7 weekly   • Drug use: No     I reviewed patients allergies, problem list and medications today in " "EPIC.    ROS: Any/all pertinent positives listed in the HPI, otherwise all others reviewed are negative today.      BP (!) 98/60 (BP Location: Left arm, Patient Position: Sitting, BP Cuff Size: Adult)   Pulse (!) 44   Temp 37.1 °C (98.7 °F) (Temporal)   Resp 14   Ht 1.753 m (5' 9\")   Wt 68.4 kg (150 lb 12.8 oz)   SpO2 99%   BMI 22.27 kg/m²     Exam:    Gen: Alert and oriented, No apparent distress. WDWN  Psych: A+Ox3, normal affect and mood  Skin: Warm, dry and intact. Good turgor   No rashes in visible areas.  Eye: Conjunctiva clear, lids normal  ENMT: Lips without lesions, good dentition  Lungs: Clear to auscultation bilaterally, no rales or rhonchi   Unlabored respiratory effort.   CV: Regular rate and rhythm, S1, S2. No murmurs.   No Edema  Abd: Soft tenderness in the epigastric area and to a milder extent the right upper quadrant as well.  None in the other quadrants.  Non distended. Normal active bowel sounds.    No Hepatosplenomegaly, No pulsatile masses.   No CVA tenderness, no suprapubic tenderness        Assessment and Plan.   34 y.o. female with the following issues.    1. RUQ pain/Postprandial epigastric pain  Stable.  Ultrasound ordered and will see if we get this scheduled today-she is currently still NPO.  She will do the labs as below  today.  She will keep her diet bland and she has any worsening symptoms such as fever or severe pain she will be seen in the ER.  - US-ABDOMEN COMPLETE SURVEY; Future  - Comp Metabolic Panel; Future  - CBC WITH DIFFERENTIAL; Future  - LIPASE; Future  - AMYLASE; Future  - HCG QUANTITATIVE; Future          "

## 2019-09-04 ENCOUNTER — APPOINTMENT (OUTPATIENT)
Dept: RADIOLOGY | Facility: MEDICAL CENTER | Age: 34
End: 2019-09-04
Attending: NURSE PRACTITIONER
Payer: COMMERCIAL

## 2019-09-04 ENCOUNTER — TELEPHONE (OUTPATIENT)
Dept: MEDICAL GROUP | Facility: MEDICAL CENTER | Age: 34
End: 2019-09-04

## 2019-09-04 DIAGNOSIS — R10.13 POSTPRANDIAL EPIGASTRIC PAIN: ICD-10-CM

## 2019-09-04 DIAGNOSIS — R93.5 ABNORMAL ULTRASOUND OF ABDOMEN: ICD-10-CM

## 2019-09-04 DIAGNOSIS — R10.11 RUQ PAIN: ICD-10-CM

## 2019-09-04 NOTE — TELEPHONE ENCOUNTER
"Patient called back. She did see message. I asked her if she wanted help scheduling her CT, and she declined. I asked her to point out to the schedulers that it is marked \"Urgent\" and to let us know if there are any issues.   "

## 2019-09-04 NOTE — TELEPHONE ENCOUNTER
Says pt viewed [email or web address] message. I called pt @ 1:10 and she requested to call us back in 5 minutes or so.

## 2019-09-04 NOTE — TELEPHONE ENCOUNTER
Please call and let patient know to check her mychart.    I ordered a CT of the abdomen. Please schedule if she needs help with scheduling.

## 2019-09-10 ENCOUNTER — PATIENT MESSAGE (OUTPATIENT)
Dept: MEDICAL GROUP | Facility: MEDICAL CENTER | Age: 34
End: 2019-09-10

## 2019-09-13 ENCOUNTER — APPOINTMENT (OUTPATIENT)
Dept: RADIOLOGY | Facility: MEDICAL CENTER | Age: 34
End: 2019-09-13
Attending: NURSE PRACTITIONER
Payer: COMMERCIAL

## 2020-02-03 RX ORDER — OMEPRAZOLE 40 MG/1
CAPSULE, DELAYED RELEASE ORAL
Qty: 90 CAP | Refills: 3 | Status: SHIPPED | OUTPATIENT
Start: 2020-02-03 | End: 2021-02-04 | Stop reason: SDUPTHER

## 2021-02-04 RX ORDER — OMEPRAZOLE 40 MG/1
40 CAPSULE, DELAYED RELEASE ORAL
Qty: 90 CAP | Refills: 0 | Status: SHIPPED | OUTPATIENT
Start: 2021-02-04 | End: 2022-06-01

## 2021-02-04 NOTE — TELEPHONE ENCOUNTER
Patient Seen: 9/3/2019 With ZULMA Webster  Next Appointment: 3/18/2021 With ZULMA Huerta  Was the patient seen in the last year in this department? Yes     Does patient have an active prescription for medications requested? No     Received Request Via: Patient

## 2022-05-15 SDOH — ECONOMIC STABILITY: HOUSING INSECURITY
IN THE LAST 12 MONTHS, WAS THERE A TIME WHEN YOU DID NOT HAVE A STEADY PLACE TO SLEEP OR SLEPT IN A SHELTER (INCLUDING NOW)?: NO

## 2022-05-15 SDOH — HEALTH STABILITY: MENTAL HEALTH
STRESS IS WHEN SOMEONE FEELS TENSE, NERVOUS, ANXIOUS, OR CAN'T SLEEP AT NIGHT BECAUSE THEIR MIND IS TROUBLED. HOW STRESSED ARE YOU?: TO SOME EXTENT

## 2022-05-15 SDOH — ECONOMIC STABILITY: FOOD INSECURITY: WITHIN THE PAST 12 MONTHS, YOU WORRIED THAT YOUR FOOD WOULD RUN OUT BEFORE YOU GOT MONEY TO BUY MORE.: NEVER TRUE

## 2022-05-15 SDOH — ECONOMIC STABILITY: FOOD INSECURITY: WITHIN THE PAST 12 MONTHS, THE FOOD YOU BOUGHT JUST DIDN'T LAST AND YOU DIDN'T HAVE MONEY TO GET MORE.: NEVER TRUE

## 2022-05-15 SDOH — HEALTH STABILITY: PHYSICAL HEALTH: ON AVERAGE, HOW MANY DAYS PER WEEK DO YOU ENGAGE IN MODERATE TO STRENUOUS EXERCISE (LIKE A BRISK WALK)?: 7 DAYS

## 2022-05-15 SDOH — ECONOMIC STABILITY: TRANSPORTATION INSECURITY
IN THE PAST 12 MONTHS, HAS THE LACK OF TRANSPORTATION KEPT YOU FROM MEDICAL APPOINTMENTS OR FROM GETTING MEDICATIONS?: NO

## 2022-05-15 SDOH — ECONOMIC STABILITY: HOUSING INSECURITY: IN THE LAST 12 MONTHS, HOW MANY PLACES HAVE YOU LIVED?: 2

## 2022-05-15 SDOH — ECONOMIC STABILITY: TRANSPORTATION INSECURITY
IN THE PAST 12 MONTHS, HAS LACK OF TRANSPORTATION KEPT YOU FROM MEETINGS, WORK, OR FROM GETTING THINGS NEEDED FOR DAILY LIVING?: NO

## 2022-05-15 SDOH — ECONOMIC STABILITY: INCOME INSECURITY: IN THE LAST 12 MONTHS, WAS THERE A TIME WHEN YOU WERE NOT ABLE TO PAY THE MORTGAGE OR RENT ON TIME?: NO

## 2022-05-15 SDOH — HEALTH STABILITY: PHYSICAL HEALTH: ON AVERAGE, HOW MANY MINUTES DO YOU ENGAGE IN EXERCISE AT THIS LEVEL?: 60 MIN

## 2022-05-15 SDOH — ECONOMIC STABILITY: INCOME INSECURITY: HOW HARD IS IT FOR YOU TO PAY FOR THE VERY BASICS LIKE FOOD, HOUSING, MEDICAL CARE, AND HEATING?: NOT HARD AT ALL

## 2022-05-15 SDOH — ECONOMIC STABILITY: TRANSPORTATION INSECURITY
IN THE PAST 12 MONTHS, HAS LACK OF RELIABLE TRANSPORTATION KEPT YOU FROM MEDICAL APPOINTMENTS, MEETINGS, WORK OR FROM GETTING THINGS NEEDED FOR DAILY LIVING?: NO

## 2022-05-15 ASSESSMENT — LIFESTYLE VARIABLES
HOW OFTEN DO YOU HAVE SIX OR MORE DRINKS ON ONE OCCASION: NEVER
HOW OFTEN DO YOU HAVE SIX OR MORE DRINKS ON ONE OCCASION: NEVER
SKIP TO QUESTIONS 9-10: 1
HOW OFTEN DO YOU HAVE A DRINK CONTAINING ALCOHOL: 4 OR MORE TIMES A WEEK
HOW OFTEN DO YOU HAVE A DRINK CONTAINING ALCOHOL: 4 OR MORE TIMES A WEEK
AUDIT-C TOTAL SCORE: 4
SKIP TO QUESTIONS 9-10: 1
AUDIT-C TOTAL SCORE: 4
HOW MANY STANDARD DRINKS CONTAINING ALCOHOL DO YOU HAVE ON A TYPICAL DAY: 1 OR 2
HOW MANY STANDARD DRINKS CONTAINING ALCOHOL DO YOU HAVE ON A TYPICAL DAY: 1 OR 2

## 2022-05-15 ASSESSMENT — SOCIAL DETERMINANTS OF HEALTH (SDOH)
DO YOU BELONG TO ANY CLUBS OR ORGANIZATIONS SUCH AS CHURCH GROUPS UNIONS, FRATERNAL OR ATHLETIC GROUPS, OR SCHOOL GROUPS?: YES
HOW OFTEN DO YOU HAVE SIX OR MORE DRINKS ON ONE OCCASION: NEVER
HOW OFTEN DO YOU GET TOGETHER WITH FRIENDS OR RELATIVES?: ONCE A WEEK
HOW OFTEN DO YOU ATTEND CHURCH OR RELIGIOUS SERVICES?: 1 TO 4 TIMES PER YEAR
HOW OFTEN DO YOU ATTENT MEETINGS OF THE CLUB OR ORGANIZATION YOU BELONG TO?: 1 TO 4 TIMES PER YEAR
HOW OFTEN DO YOU GET TOGETHER WITH FRIENDS OR RELATIVES?: ONCE A WEEK
HOW OFTEN DO YOU GET TOGETHER WITH FRIENDS OR RELATIVES?: ONCE A WEEK
IN A TYPICAL WEEK, HOW MANY TIMES DO YOU TALK ON THE PHONE WITH FAMILY, FRIENDS, OR NEIGHBORS?: ONCE A WEEK
DO YOU BELONG TO ANY CLUBS OR ORGANIZATIONS SUCH AS CHURCH GROUPS UNIONS, FRATERNAL OR ATHLETIC GROUPS, OR SCHOOL GROUPS?: YES
DO YOU BELONG TO ANY CLUBS OR ORGANIZATIONS SUCH AS CHURCH GROUPS UNIONS, FRATERNAL OR ATHLETIC GROUPS, OR SCHOOL GROUPS?: YES
WITHIN THE PAST 12 MONTHS, YOU WORRIED THAT YOUR FOOD WOULD RUN OUT BEFORE YOU GOT THE MONEY TO BUY MORE: NEVER TRUE
HOW OFTEN DO YOU HAVE A DRINK CONTAINING ALCOHOL: 4 OR MORE TIMES A WEEK
HOW OFTEN DO YOU ATTEND CHURCH OR RELIGIOUS SERVICES?: 1 TO 4 TIMES PER YEAR
HOW OFTEN DO YOU ATTENT MEETINGS OF THE CLUB OR ORGANIZATION YOU BELONG TO?: 1 TO 4 TIMES PER YEAR
HOW OFTEN DO YOU ATTENT MEETINGS OF THE CLUB OR ORGANIZATION YOU BELONG TO?: 1 TO 4 TIMES PER YEAR
HOW MANY DRINKS CONTAINING ALCOHOL DO YOU HAVE ON A TYPICAL DAY WHEN YOU ARE DRINKING: 1 OR 2
IN A TYPICAL WEEK, HOW MANY TIMES DO YOU TALK ON THE PHONE WITH FAMILY, FRIENDS, OR NEIGHBORS?: ONCE A WEEK
HOW OFTEN DO YOU ATTEND CHURCH OR RELIGIOUS SERVICES?: 1 TO 4 TIMES PER YEAR
IN A TYPICAL WEEK, HOW MANY TIMES DO YOU TALK ON THE PHONE WITH FAMILY, FRIENDS, OR NEIGHBORS?: ONCE A WEEK
HOW HARD IS IT FOR YOU TO PAY FOR THE VERY BASICS LIKE FOOD, HOUSING, MEDICAL CARE, AND HEATING?: NOT HARD AT ALL

## 2022-05-16 ENCOUNTER — TELEPHONE (OUTPATIENT)
Dept: MEDICAL GROUP | Facility: PHYSICIAN GROUP | Age: 37
End: 2022-05-16
Payer: COMMERCIAL

## 2022-05-16 NOTE — TELEPHONE ENCOUNTER
Phone Number Called: [phone] (home)     Call outcome: Spoke to patient regarding message below.    Message: Pt needed to r/s her appt, helped r/s Pt for establishing appt with Laz Hsu for 06/01/22 @ 3:20 PM

## 2022-05-16 NOTE — TELEPHONE ENCOUNTER
[employer]  1. Caller Name: Gogo Wong                      Call Back Number: [phone] (home)     2. Message: Pt called to ask questions about her appt to est with Laz Hsu.  Asked for a cb.

## 2022-05-17 ENCOUNTER — APPOINTMENT (OUTPATIENT)
Dept: MEDICAL GROUP | Facility: PHYSICIAN GROUP | Age: 37
End: 2022-05-17
Payer: COMMERCIAL

## 2022-06-01 ENCOUNTER — OFFICE VISIT (OUTPATIENT)
Dept: MEDICAL GROUP | Facility: PHYSICIAN GROUP | Age: 37
End: 2022-06-01
Payer: COMMERCIAL

## 2022-06-01 ENCOUNTER — HOSPITAL ENCOUNTER (OUTPATIENT)
Facility: MEDICAL CENTER | Age: 37
End: 2022-06-01
Payer: COMMERCIAL

## 2022-06-01 VITALS
HEART RATE: 92 BPM | HEIGHT: 70 IN | DIASTOLIC BLOOD PRESSURE: 60 MMHG | WEIGHT: 133.4 LBS | OXYGEN SATURATION: 97 % | RESPIRATION RATE: 12 BRPM | SYSTOLIC BLOOD PRESSURE: 84 MMHG | BODY MASS INDEX: 19.1 KG/M2 | TEMPERATURE: 99.3 F

## 2022-06-01 DIAGNOSIS — G47.09 OTHER INSOMNIA: ICD-10-CM

## 2022-06-01 DIAGNOSIS — F51.09 SITUATIONAL INSOMNIA: ICD-10-CM

## 2022-06-01 PROBLEM — F51.01 PRIMARY INSOMNIA: Status: ACTIVE | Noted: 2022-06-01

## 2022-06-01 PROBLEM — F51.01 PRIMARY INSOMNIA: Status: RESOLVED | Noted: 2022-06-01 | Resolved: 2022-06-01

## 2022-06-01 PROCEDURE — 99213 OFFICE O/P EST LOW 20 MIN: CPT

## 2022-06-01 PROCEDURE — 80307 DRUG TEST PRSMV CHEM ANLYZR: CPT

## 2022-06-01 RX ORDER — ZOLPIDEM TARTRATE 5 MG/1
TABLET ORAL
COMMUNITY
Start: 2022-05-03 | End: 2022-06-01

## 2022-06-01 RX ORDER — PANTOPRAZOLE SODIUM 40 MG/1
TABLET, DELAYED RELEASE ORAL
COMMUNITY
Start: 2021-07-13 | End: 2022-07-21 | Stop reason: SDUPTHER

## 2022-06-01 RX ORDER — MELATONIN 3 MG
LOZENGE ORAL
COMMUNITY
Start: 2020-03-15

## 2022-06-01 RX ORDER — PANTOPRAZOLE SODIUM 20 MG/1
TABLET, DELAYED RELEASE ORAL
COMMUNITY
Start: 2022-05-04 | End: 2022-06-01

## 2022-06-01 RX ORDER — ZOLPIDEM TARTRATE 5 MG/1
TABLET ORAL
COMMUNITY
Start: 2020-02-15 | End: 2022-07-21 | Stop reason: SDUPTHER

## 2022-06-01 RX ORDER — PANTOPRAZOLE SODIUM 20 MG/1
40 TABLET, DELAYED RELEASE ORAL DAILY
COMMUNITY
End: 2022-06-01

## 2022-06-01 SDOH — HEALTH STABILITY: PHYSICAL HEALTH: ON AVERAGE, HOW MANY MINUTES DO YOU ENGAGE IN EXERCISE AT THIS LEVEL?: 60 MIN

## 2022-06-01 SDOH — HEALTH STABILITY: PHYSICAL HEALTH: ON AVERAGE, HOW MANY DAYS PER WEEK DO YOU ENGAGE IN MODERATE TO STRENUOUS EXERCISE (LIKE A BRISK WALK)?: 7 DAYS

## 2022-06-01 SDOH — ECONOMIC STABILITY: FOOD INSECURITY: WITHIN THE PAST 12 MONTHS, YOU WORRIED THAT YOUR FOOD WOULD RUN OUT BEFORE YOU GOT MONEY TO BUY MORE.: NEVER TRUE

## 2022-06-01 SDOH — ECONOMIC STABILITY: INCOME INSECURITY: IN THE LAST 12 MONTHS, WAS THERE A TIME WHEN YOU WERE NOT ABLE TO PAY THE MORTGAGE OR RENT ON TIME?: NO

## 2022-06-01 SDOH — ECONOMIC STABILITY: FOOD INSECURITY: WITHIN THE PAST 12 MONTHS, THE FOOD YOU BOUGHT JUST DIDN'T LAST AND YOU DIDN'T HAVE MONEY TO GET MORE.: NEVER TRUE

## 2022-06-01 SDOH — ECONOMIC STABILITY: INCOME INSECURITY: HOW HARD IS IT FOR YOU TO PAY FOR THE VERY BASICS LIKE FOOD, HOUSING, MEDICAL CARE, AND HEATING?: NOT HARD AT ALL

## 2022-06-01 SDOH — ECONOMIC STABILITY: HOUSING INSECURITY: IN THE LAST 12 MONTHS, HOW MANY PLACES HAVE YOU LIVED?: 2

## 2022-06-01 ASSESSMENT — SOCIAL DETERMINANTS OF HEALTH (SDOH)
HOW OFTEN DO YOU ATTENT MEETINGS OF THE CLUB OR ORGANIZATION YOU BELONG TO?: 1 TO 4 TIMES PER YEAR
HOW OFTEN DO YOU ATTEND CHURCH OR RELIGIOUS SERVICES?: 1 TO 4 TIMES PER YEAR
HOW OFTEN DO YOU GET TOGETHER WITH FRIENDS OR RELATIVES?: ONCE A WEEK
HOW OFTEN DO YOU ATTEND CHURCH OR RELIGIOUS SERVICES?: 1 TO 4 TIMES PER YEAR
DO YOU BELONG TO ANY CLUBS OR ORGANIZATIONS SUCH AS CHURCH GROUPS UNIONS, FRATERNAL OR ATHLETIC GROUPS, OR SCHOOL GROUPS?: YES
HOW MANY DRINKS CONTAINING ALCOHOL DO YOU HAVE ON A TYPICAL DAY WHEN YOU ARE DRINKING: 1 OR 2
HOW OFTEN DO YOU HAVE A DRINK CONTAINING ALCOHOL: 4 OR MORE TIMES A WEEK
WITHIN THE PAST 12 MONTHS, YOU WORRIED THAT YOUR FOOD WOULD RUN OUT BEFORE YOU GOT THE MONEY TO BUY MORE: NEVER TRUE
HOW OFTEN DO YOU GET TOGETHER WITH FRIENDS OR RELATIVES?: ONCE A WEEK
HOW OFTEN DO YOU ATTENT MEETINGS OF THE CLUB OR ORGANIZATION YOU BELONG TO?: 1 TO 4 TIMES PER YEAR
DO YOU BELONG TO ANY CLUBS OR ORGANIZATIONS SUCH AS CHURCH GROUPS UNIONS, FRATERNAL OR ATHLETIC GROUPS, OR SCHOOL GROUPS?: YES
IN A TYPICAL WEEK, HOW MANY TIMES DO YOU TALK ON THE PHONE WITH FAMILY, FRIENDS, OR NEIGHBORS?: ONCE A WEEK
HOW OFTEN DO YOU HAVE SIX OR MORE DRINKS ON ONE OCCASION: NEVER
IN A TYPICAL WEEK, HOW MANY TIMES DO YOU TALK ON THE PHONE WITH FAMILY, FRIENDS, OR NEIGHBORS?: ONCE A WEEK
HOW HARD IS IT FOR YOU TO PAY FOR THE VERY BASICS LIKE FOOD, HOUSING, MEDICAL CARE, AND HEATING?: NOT HARD AT ALL

## 2022-06-01 ASSESSMENT — LIFESTYLE VARIABLES
HOW OFTEN DO YOU HAVE SIX OR MORE DRINKS ON ONE OCCASION: NEVER
HOW MANY STANDARD DRINKS CONTAINING ALCOHOL DO YOU HAVE ON A TYPICAL DAY: 1 OR 2
AUDIT-C TOTAL SCORE: 4
SKIP TO QUESTIONS 9-10: 1
HOW OFTEN DO YOU HAVE A DRINK CONTAINING ALCOHOL: 4 OR MORE TIMES A WEEK

## 2022-06-01 ASSESSMENT — PATIENT HEALTH QUESTIONNAIRE - PHQ9: CLINICAL INTERPRETATION OF PHQ2 SCORE: 0

## 2022-06-01 NOTE — ASSESSMENT & PLAN NOTE
Chronic condition currently active when patient works nights  -PDMP checked, patient is appropriate for  medication refill- obtained and reviewed patient utilization report from [facility] pharmacy database on 6/1/2022 3:59 PM  prior to writing prescription for controlled substance II, III or IV per [place] bill . Based on assessment of the report, the prescription is medically necessary.   - Substance use agreement form signed  - Urine drug screen obtained  - Sleep hygiene discussed.  -Risk, benefits, and ADRs discussed

## 2022-06-01 NOTE — PROGRESS NOTES
"Subjective:     CC:  Diagnoses of Other insomnia and Situational insomnia were pertinent to this visit.    HISTORY OF THE PRESENT ILLNESS: Patient is a 37 y.o. female. 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  She has had difficulty with obtaining adequate sleep during this schedule.  Primary insomnia has been present for approximately 5 years.  She utilizes Ambien on an as-needed basis when she works nights. This medication works well for her with no reported side effects.  She is requesting refill for this medication when it is due.     Primary Insomnia (Resolved)       Health Maintenance: Completed well woman with Pap recommended    ROS:   Review of Systems   All other systems reviewed and are negative.        Objective:     Exam: BP (!) 84/60 (BP Location: Left arm, Patient Position: Sitting, BP Cuff Size: Adult)   Pulse 92   Temp 37.4 °C (99.3 °F) (Temporal)   Resp 12   Ht 1.765 m (5' 9.5\")   Wt 60.5 kg (133 lb 6.4 oz)   SpO2 97%  Body mass index is 19.42 kg/m².    Physical Exam  Constitutional:       General: She is not in acute distress.     Appearance: Normal appearance. She is not ill-appearing or toxic-appearing.   HENT:      Head: Normocephalic.      Right Ear: Tympanic membrane, ear canal and external ear normal.      Left Ear: Tympanic membrane, ear canal and external ear normal.      Nose: Nose normal.      Mouth/Throat:      Mouth: Mucous membranes are moist.      Pharynx: Oropharynx is clear.   Eyes:      Extraocular Movements: Extraocular movements intact.      Conjunctiva/sclera: Conjunctivae normal.      Pupils: Pupils are equal, round, and reactive to light.   Cardiovascular:      Rate and Rhythm: Normal rate and regular rhythm.      Pulses: Normal pulses.      Heart sounds: Normal heart sounds. No murmur heard.  Pulmonary:      Effort: " Pulmonary effort is normal. No respiratory distress.      Breath sounds: Normal breath sounds.   Musculoskeletal:         General: Normal range of motion.      Cervical back: Normal range of motion and neck supple.   Lymphadenopathy:      Cervical: No cervical adenopathy.   Skin:     General: Skin is warm and dry.      Capillary Refill: Capillary refill takes less than 2 seconds.   Neurological:      General: No focal deficit present.      Mental Status: She is alert and oriented to person, place, and time.   Psychiatric:         Mood and Affect: Mood normal.         Behavior: Behavior normal.           Labs: none    Assessment & Plan: Medical Decision Making   37 y.o. female with the following -    Problem List Items Addressed This Visit     Situational insomnia     Chronic condition currently active when patient works nights  -PDMP checked, patient is appropriate for  medication refill- obtained and reviewed patient utilization report from [facility] pharmacy database on 6/1/2022 3:59 PM  prior to writing prescription for controlled substance II, III or IV per [place] bill . Based on assessment of the report, the prescription is medically necessary.   - Substance use agreement form signed  - Urine drug screen obtained  - Sleep hygiene discussed.  -Risk, benefits, and ADRs discussed               Other Visit Diagnoses     Other insomnia        Relevant Orders    URINE DRUG SCREEN    Controlled Substance Treatment Agreement          Differential diagnosis, natural history, supportive care, and indications for immediate follow-up discussed.  Shared decision making approach was utilized, and patient is amendable with plan of care.  Patient understands to return to clinic or go to the emergency department if symptoms worsen. All questions and concerns addressed.      Return in about 3 months (around 9/1/2022) for Wellness with Pap.    Please note that this dictation was created using voice recognition software. I  have made every reasonable attempt to correct obvious errors, but I expect that there are errors of grammar and possibly content that I did not discover before finalizing the note.

## 2022-06-01 NOTE — ASSESSMENT & PLAN NOTE
Chronic condition currently active when patient works nights  -PDMP checked, patient is appropriate for  medication refill- obtained and reviewed patient utilization report from [facility] pharmacy database on 6/1/2022 3:59 PM  prior to writing prescription for controlled substance II, III or IV per [place] bill . Based on assessment of the report, the prescription is medically necessary.     - Substance use agreement form signed  - Urine drug screen obtained  - Sleep hygiene discussed.  -Risk, benefits, and ADRs discussed

## 2022-06-03 LAB
AMPHETAMINES UR QL: NEGATIVE NG/ML
BARBITURATES UR QL: NEGATIVE NG/ML
BENZODIAZ UR QL: NEGATIVE NG/ML
CANNABINOIDS UR QL SCN: NEGATIVE NG/ML
COCAINE UR QL: NEGATIVE NG/ML
DRUG SCREEN COMMENT UR-IMP: NORMAL
MDMA CTO UR SCN-MCNC: NEGATIVE NG/ML
METHADONE UR QL: NEGATIVE NG/ML
OPIATES UR QL: NEGATIVE NG/ML
OXYCODONE CTO UR SCN-MCNC: NEGATIVE NG/ML
PCP UR QL SCN: NEGATIVE NG/ML
PROPOXYPH UR QL: NEGATIVE NG/ML

## 2022-07-21 DIAGNOSIS — F51.09 SITUATIONAL INSOMNIA: ICD-10-CM

## 2022-07-21 RX ORDER — ZOLPIDEM TARTRATE 5 MG/1
TABLET ORAL
Qty: 30 TABLET | Refills: 0 | Status: SHIPPED | OUTPATIENT
Start: 2022-07-21 | End: 2023-02-28 | Stop reason: SDUPTHER

## 2022-07-21 RX ORDER — PANTOPRAZOLE SODIUM 40 MG/1
40 TABLET, DELAYED RELEASE ORAL DAILY
Qty: 30 TABLET | Refills: 6 | Status: SHIPPED | OUTPATIENT
Start: 2022-07-21 | End: 2023-01-24 | Stop reason: SDUPTHER

## 2022-07-22 NOTE — PROGRESS NOTES
Refill sent for Ambien.  Controlled Substance Agreement for signed and on file. Controlled substance discussed with client. Client agrees to abide by controlled substance contract. PDMP checked and patient is appropriate for refill.  Has used this medication for many years without side effects.  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  Risk and benefits of using this medication known to patient.

## 2022-10-18 ENCOUNTER — OFFICE VISIT (OUTPATIENT)
Dept: MEDICAL GROUP | Facility: PHYSICIAN GROUP | Age: 37
End: 2022-10-18
Payer: COMMERCIAL

## 2022-10-18 ENCOUNTER — HOSPITAL ENCOUNTER (OUTPATIENT)
Facility: MEDICAL CENTER | Age: 37
End: 2022-10-18
Payer: COMMERCIAL

## 2022-10-18 VITALS
BODY MASS INDEX: 20.41 KG/M2 | SYSTOLIC BLOOD PRESSURE: 118 MMHG | OXYGEN SATURATION: 99 % | DIASTOLIC BLOOD PRESSURE: 70 MMHG | TEMPERATURE: 98.8 F | RESPIRATION RATE: 20 BRPM | WEIGHT: 137.8 LBS | HEIGHT: 69 IN | HEART RATE: 49 BPM

## 2022-10-18 DIAGNOSIS — Z01.419 WELL WOMAN EXAM: ICD-10-CM

## 2022-10-18 DIAGNOSIS — Z00.00 WELLNESS EXAMINATION: ICD-10-CM

## 2022-10-18 PROCEDURE — 88175 CYTOPATH C/V AUTO FLUID REDO: CPT

## 2022-10-18 PROCEDURE — 99395 PREV VISIT EST AGE 18-39: CPT

## 2022-10-18 PROCEDURE — 87624 HPV HI-RISK TYP POOLED RSLT: CPT

## 2022-10-19 NOTE — PROGRESS NOTES
Subjective:     CC:   Chief Complaint   Patient presents with    Gynecologic Exam       HPI:   Gogo Wong is a 37 y.o. female who presents for annual exam    Patient has GYN provider: No   Last Pap Smear:    H/O Abnormal Pap: No  Last Mammogram: , No abnormal findings  Last Bone Density Test: n/a  Last Colorectal Cancer Screening: n/a  Last Tdap: UTD 2018  Received HPV series: No    Exercise: strenuous regular exercise, aerobic > 3 hours a week  Diet: good      Patient's last menstrual period was 10/03/2022.  Hx STDs: No  Birth control: no  Menses every month with 5 days with light, moderate, heavy bleeding.  Reports severe cramping and does take OTC analgesics for cramps.  No significant bloating/fluid retention, pelvic pain, or dyspareunia. No abnormal vaginal discharge.  No breast tenderness, mass, nipple discharge, changes in size or contour, or abnormal cyclic discomfort.    OB History    Para Term  AB Living   0 0 0 0 0 0   SAB IAB Ectopic Molar Multiple Live Births   0 0 0 0 0 0      She  reports being sexually active and has had partner(s) who are male. She reports using the following method of birth control/protection: Condom.    She  has a past medical history of Anxiety (10/6/2015) and Bradycardia, sinus (3/15/2017).    She has no past medical history of Asthma, Blood transfusion without reported diagnosis, Cancer ([facility]), CHF (congestive heart failure) ([facility]), Clotting disorder ([facility]), COPD (chronic obstructive pulmonary disease) ([facility]), Depression, Diabetes ([facility]), Diabetic neuropathy ([facility]), Heart attack ([facility]), Heart murmur, Hyperlipidemia, Kidney disease, Migraine, Seizure ([facility]), Stroke ([facility]), or Thyroid disease.  She  has no past surgical history on file.    Family History   Problem Relation Age of Onset    No Known Problems Mother     Hypertension Father     Arthritis Sister         psoriatic arthritis    Cancer Paternal Aunt         BRCA    Cancer Maternal Grandfather          stomach CA     Social History     Tobacco Use    Smoking status: Former    Smokeless tobacco: Never   Vaping Use    Vaping Use: Never used   Substance Use Topics    Alcohol use: Yes     Alcohol/week: 3.0 - 4.2 oz     Types: 5 - 7 Shots of liquor per week     Comment: Socially, at most 5-7 weekly    Drug use: No       Patient Active Problem List    Diagnosis Date Noted    Situational insomnia 12/30/2021    Bradycardia, sinus 03/15/2017    Patellofemoral syndrome 10/06/2015    GERD (gastroesophageal reflux disease) 10/06/2015    Performance anxiety 10/06/2015    Raynaud's disease 10/06/2015    IBS (irritable bowel syndrome) 10/06/2015     Current Outpatient Medications   Medication Sig Dispense Refill    pantoprazole (PROTONIX) 40 MG Tablet Delayed Response Take 1 Tablet by mouth every day. 30 Tablet 6    zolpidem (AMBIEN) 5 MG Tab TAKE ONE TABLET OP AT BEDTIME AS NEEDED FOR SLEEP FOR DAYS *F51.09 (10 TABS/30 DAY SUPPLY PER MD OFFICE) 30 Tablet 0    Melatonin 1 MG/4ML Liquid       propranolol (INDERAL) 20 MG Tab Take 1 Tab by mouth 1 time daily as needed. 60 Tab 0    ibuprofen (MOTRIN) 600 MG Tab Take 600 mg by mouth every 6 hours as needed.       No current facility-administered medications for this visit.     No Known Allergies    Review of Systems   Constitutional: Negative for fever, chills and malaise/fatigue.   HENT: Negative for congestion.    Eyes: Negative for pain.   Respiratory: Negative for cough and shortness of breath.    Cardiovascular: Negative for chest pain and leg swelling.   Gastrointestinal: Negative for nausea, vomiting, abdominal pain and diarrhea.   Genitourinary: Negative for dysuria and hematuria.   Skin: Negative for rash.   Neurological: Negative for dizziness, focal weakness and headaches.   Endo/Heme/Allergies: Does not bruise/bleed easily.   Psychiatric/Behavioral: Negative for depression.  The patient is not nervous/anxious.      Objective:   /70 (BP Location: Left arm, Patient  "Position: Sitting, BP Cuff Size: Adult)   Pulse (!) 49   Temp 37.1 °C (98.8 °F) (Temporal)   Resp 20   Ht 1.753 m (5' 9\")   Wt 62.5 kg (137 lb 12.8 oz)   LMP 10/03/2022   SpO2 99%   BMI 20.35 kg/m²     Wt Readings from Last 4 Encounters:   10/18/22 62.5 kg (137 lb 12.8 oz)   06/01/22 60.5 kg (133 lb 6.4 oz)   09/03/19 68.4 kg (150 lb 12.8 oz)   07/01/19 64.6 kg (142 lb 6.4 oz)       A chaperone was offered to the patient during today's exam. Patient declined chaperone.    Physical Exam:  Constitutional: Well-developed and well-nourished. Not diaphoretic. No distress.   Skin: Skin is warm and dry. No rash noted.  Head: Atraumatic without lesions.  Eyes: Conjunctivae and extraocular motions are normal. Pupils are equal, round, and reactive to light. No scleral icterus.   Ears:  External ears unremarkable. Tympanic membranes clear and intact.  Nose: Nares patent. Septum midline. Turbinates without erythema nor edema. No discharge.   Mouth/Throat: Tongue normal. Oropharynx is clear and moist. Posterior pharynx without erythema or exudates.  Neck: Supple, trachea midline. Normal range of motion. No thyromegaly present. No lymphadenopathy--cervical or supraclavicular.  Cardiovascular: Regular rate and rhythm, S1 and S2 without murmur, rubs, or gallops.    Respiratory: Effort normal. Clear to auscultation throughout. No adventitious sounds.   Abdomen: Soft, non tender, and without distention. Active bowel sounds in all four quadrants. No rebound, guarding, masses or HSM.  : Perineum and external genitalia normal without rash. Vagina with normal and physiologic discharge. Cervix without visible lesions or discharge.  Extremities: No cyanosis, clubbing, erythema, nor edema. Distal pulses intact and symmetric.   Musculoskeletal: All major joints AROM full in all directions without pain.  Neurological: Alert and oriented x 3. Grossly non-focal. Strength and sensation grossly intact. DTRs 2+/3 and symmetric. "   Psychiatric:  Behavior, mood, and affect are appropriate.    Assessment and Plan:     1. Wellness examination    2. Well woman exam  - THINPREP PAP WITH HPV; Future    Health maintenance:   Up-to-date  Labs per orders  Immunizations per orders  Patient counseled about skin care, diet, supplements, and exercise.  Discussed  breast self exam, mammography screening, diet and exercise     Follow-up: Return in about 1 year (around 10/18/2023) for Annual Wellness Visit.

## 2022-10-20 DIAGNOSIS — Z01.419 WELL WOMAN EXAM: ICD-10-CM

## 2022-10-20 LAB
CYTOLOGY REG CYTOL: NORMAL
HPV HR 12 DNA CVX QL NAA+PROBE: NEGATIVE
HPV16 DNA SPEC QL NAA+PROBE: NEGATIVE
HPV18 DNA SPEC QL NAA+PROBE: NEGATIVE
SPECIMEN SOURCE: NORMAL

## 2023-02-28 ENCOUNTER — TELEMEDICINE (OUTPATIENT)
Dept: MEDICAL GROUP | Facility: PHYSICIAN GROUP | Age: 38
End: 2023-02-28
Payer: COMMERCIAL

## 2023-02-28 VITALS
HEIGHT: 69 IN | TEMPERATURE: 97.6 F | HEART RATE: 52 BPM | OXYGEN SATURATION: 100 % | BODY MASS INDEX: 20.29 KG/M2 | WEIGHT: 137 LBS

## 2023-02-28 DIAGNOSIS — Z13.6 SCREENING FOR CARDIOVASCULAR CONDITION: ICD-10-CM

## 2023-02-28 DIAGNOSIS — Z00.00 HEALTHCARE MAINTENANCE: ICD-10-CM

## 2023-02-28 DIAGNOSIS — Z13.29 THYROID DISORDER SCREEN: ICD-10-CM

## 2023-02-28 DIAGNOSIS — Z92.29 HISTORY OF HEPATITIS B VACCINATION: ICD-10-CM

## 2023-02-28 DIAGNOSIS — F51.09 SITUATIONAL INSOMNIA: ICD-10-CM

## 2023-02-28 DIAGNOSIS — Z12.31 ENCOUNTER FOR SCREENING MAMMOGRAM FOR MALIGNANT NEOPLASM OF BREAST: ICD-10-CM

## 2023-02-28 DIAGNOSIS — K21.9 GASTROESOPHAGEAL REFLUX DISEASE WITHOUT ESOPHAGITIS: ICD-10-CM

## 2023-02-28 PROCEDURE — 99214 OFFICE O/P EST MOD 30 MIN: CPT

## 2023-02-28 RX ORDER — ZOLPIDEM TARTRATE 5 MG/1
TABLET ORAL
Qty: 30 TABLET | Refills: 0 | Status: SHIPPED | OUTPATIENT
Start: 2023-02-28 | End: 2023-05-31 | Stop reason: SDUPTHER

## 2023-02-28 ASSESSMENT — PATIENT HEALTH QUESTIONNAIRE - PHQ9: CLINICAL INTERPRETATION OF PHQ2 SCORE: 0

## 2023-02-28 NOTE — PROGRESS NOTES
Virtual Visit: Established Patient   This visit was conducted via Zoom using secure and encrypted videoconferencing technology.   The patient was in their home in the [facility].    The patient's identity was confirmed and verbal consent was obtained for this virtual visit.    Subjective:   CC:   Chief Complaint   Patient presents with    Follow-Up     Updat vaccine      Referral Needed     Mamogram      Medication Refill     Ambien    Orders Needed     labs     Gogo Wong is a 37 y.o. 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  Primary insomnia has been present for approximately 5 years.  She utilizes Ambien on an as-needed basis when she works nights. This medication works well for her with no reported side effects.  She is requesting refill for this medication.     Gerd (Gastroesophageal Reflux Disease)    Chronic condition for which patient is taking pantoprazole 40 mg daily.  She is doing well on this medication without reported side effects.  She would like to eventually wean off this medication.  She is requesting her magnesium levels be tested as well as vitamin D and vitamin B12.           ROS     Negative for all systems    Current medicines (including changes today)  Current Outpatient Medications   Medication Sig Dispense Refill    zolpidem (AMBIEN) 5 MG Tab TAKE ONE TABLET OP AT BEDTIME AS NEEDED FOR SLEEP FOR DAYS *F51.09 (10 TABS/30 DAY SUPPLY PER MD OFFICE) 30 Tablet 0    pantoprazole (PROTONIX) 40 MG Tablet Delayed Response Take 1 Tablet by mouth every day. 90 Tablet 2    Melatonin 1 MG/4ML Liquid       propranolol (INDERAL) 20 MG Tab Take 1 Tab by mouth 1 time daily as needed. 60 Tab 0    ibuprofen (MOTRIN) 600 MG Tab Take 600 mg by mouth every 6 hours as needed.       No current  "facility-administered medications for this visit.       Patient Active Problem List    Diagnosis Date Noted    Situational insomnia 12/30/2021    Bradycardia, sinus 03/15/2017    Patellofemoral syndrome 10/06/2015    GERD (gastroesophageal reflux disease) 10/06/2015    Performance anxiety 10/06/2015    Raynaud's disease 10/06/2015    IBS (irritable bowel syndrome) 10/06/2015        Objective:   Pulse (!) 52   Temp 36.4 °C (97.6 °F)   Ht 1.753 m (5' 9\")   Wt 62.1 kg (137 lb)   SpO2 100%   BMI 20.23 kg/m²     Physical Exam:  Constitutional: Alert, no distress, well-groomed.  Skin: No rashes in visible areas.  Eye: Round. Conjunctiva clear, lids normal. No icterus.   ENMT: Lips pink without lesions, good dentition, moist mucous membranes. Phonation normal.  Neck: No masses, no thyromegaly. Moves freely without pain.  Respiratory: Unlabored respiratory effort, no cough or audible wheeze  Psych: Alert and oriented x3, normal affect and mood.     Assessment and Plan:   The following treatment plan was discussed:   Problem List Items Addressed This Visit       GERD (gastroesophageal reflux disease)     Chronic conditions stable therefore we will continue pantoprazole 40 mg daily  -Labs ordered as requested         Relevant Orders    CBC WITH DIFFERENTIAL    VITAMIN B12    VITAMIN D,25 HYDROXY (DEFICIENCY)    Comp Metabolic Panel    MAGNESIUM    Situational insomnia     Chronic condition stable  -Patient needing refill today on Ambien 5 mg tablets.  She takes these only as needed for night shifts.  She has not had refills since July 2022.  -PDMP checked patient is appropriate for refill, controlled substance agreement is up-to-date as well as urine drug screen          Relevant Medications    zolpidem (AMBIEN) 5 MG Tab    Other Relevant Orders    HEP B SURFACE AB    CBC WITH DIFFERENTIAL    VITAMIN B12    VITAMIN D,25 HYDROXY (DEFICIENCY)    TSH WITH REFLEX TO FT4    Comp Metabolic Panel    Lipid Profile    MAGNESIUM "     Other Visit Diagnoses       History of hepatitis B vaccination        Relevant Orders    HEP B SURFACE AB    Healthcare maintenance        Relevant Medications    zolpidem (AMBIEN) 5 MG Tab    Other Relevant Orders    HEP B SURFACE AB    CBC WITH DIFFERENTIAL    VITAMIN B12    VITAMIN D,25 HYDROXY (DEFICIENCY)    TSH WITH REFLEX TO FT4    Comp Metabolic Panel    Lipid Profile    MAGNESIUM    Screening for cardiovascular condition        Relevant Orders    Comp Metabolic Panel    Lipid Profile    Thyroid disorder screen        Relevant Orders    TSH WITH REFLEX TO FT4    Encounter for screening mammogram for malignant neoplasm of breast        Relevant Orders    MA-SCREENING MAMMO BILAT W/TOMOSYNTHESIS W/CAD            1. Situational insomnia  - zolpidem (AMBIEN) 5 MG Tab; TAKE ONE TABLET OP AT BEDTIME AS NEEDED FOR SLEEP FOR DAYS *F51.09 (10 TABS/30 DAY SUPPLY PER MD OFFICE)  Dispense: 30 Tablet; Refill: 0  - HEP B SURFACE AB; Future  - CBC WITH DIFFERENTIAL; Future  - VITAMIN B12; Future  - VITAMIN D,25 HYDROXY (DEFICIENCY); Future  - TSH WITH REFLEX TO FT4; Future  - Comp Metabolic Panel; Future  - Lipid Profile; Future  - MAGNESIUM; Future    2. History of hepatitis B vaccination  - HEP B SURFACE AB; Future    3. Healthcare maintenance  - zolpidem (AMBIEN) 5 MG Tab; TAKE ONE TABLET OP AT BEDTIME AS NEEDED FOR SLEEP FOR DAYS *F51.09 (10 TABS/30 DAY SUPPLY PER MD OFFICE)  Dispense: 30 Tablet; Refill: 0  - HEP B SURFACE AB; Future  - CBC WITH DIFFERENTIAL; Future  - VITAMIN B12; Future  - VITAMIN D,25 HYDROXY (DEFICIENCY); Future  - TSH WITH REFLEX TO FT4; Future  - Comp Metabolic Panel; Future  - Lipid Profile; Future  - MAGNESIUM; Future    4. Screening for cardiovascular condition  - Comp Metabolic Panel; Future  - Lipid Profile; Future    5. Thyroid disorder screen  - TSH WITH REFLEX TO FT4; Future    6. Gastroesophageal reflux disease without esophagitis  - CBC WITH DIFFERENTIAL; Future  - VITAMIN B12;  Future  - VITAMIN D,25 HYDROXY (DEFICIENCY); Future  - Comp Metabolic Panel; Future  - MAGNESIUM; Future    7. Encounter for screening mammogram for malignant neoplasm of breast  - MA-SCREENING MAMMO BILAT W/TOMOSYNTHESIS W/CAD; Future      Follow-up: Return in about 3 months (around 5/28/2023).

## 2023-02-28 NOTE — ASSESSMENT & PLAN NOTE
Chronic conditions stable therefore we will continue pantoprazole 40 mg daily  -Labs ordered as requested

## 2023-02-28 NOTE — ASSESSMENT & PLAN NOTE
Chronic condition stable  -Patient needing refill today on Ambien 5 mg tablets.  She takes these only as needed for night shifts.  She has not had refills since July 2022.  -PDMP checked patient is appropriate for refill, controlled substance agreement is up-to-date as well as urine drug screen

## 2023-04-13 ENCOUNTER — HOSPITAL ENCOUNTER (OUTPATIENT)
Dept: RADIOLOGY | Facility: MEDICAL CENTER | Age: 38
End: 2023-04-13
Payer: COMMERCIAL

## 2023-05-15 ENCOUNTER — HOSPITAL ENCOUNTER (OUTPATIENT)
Dept: LAB | Facility: MEDICAL CENTER | Age: 38
End: 2023-05-15
Payer: COMMERCIAL

## 2023-05-15 ENCOUNTER — HOSPITAL ENCOUNTER (OUTPATIENT)
Dept: RADIOLOGY | Facility: MEDICAL CENTER | Age: 38
End: 2023-05-15
Payer: COMMERCIAL

## 2023-05-15 DIAGNOSIS — K21.9 GASTROESOPHAGEAL REFLUX DISEASE WITHOUT ESOPHAGITIS: ICD-10-CM

## 2023-05-15 DIAGNOSIS — Z13.6 SCREENING FOR CARDIOVASCULAR CONDITION: ICD-10-CM

## 2023-05-15 DIAGNOSIS — R92.8 ABNORMAL FINDING ON BREAST IMAGING: ICD-10-CM

## 2023-05-15 DIAGNOSIS — F51.09 SITUATIONAL INSOMNIA: ICD-10-CM

## 2023-05-15 DIAGNOSIS — Z13.29 THYROID DISORDER SCREEN: ICD-10-CM

## 2023-05-15 DIAGNOSIS — Z00.00 HEALTHCARE MAINTENANCE: ICD-10-CM

## 2023-05-15 DIAGNOSIS — Z12.31 ENCOUNTER FOR SCREENING MAMMOGRAM FOR MALIGNANT NEOPLASM OF BREAST: ICD-10-CM

## 2023-05-15 DIAGNOSIS — Z92.29 HISTORY OF HEPATITIS B VACCINATION: ICD-10-CM

## 2023-05-15 LAB
25(OH)D3 SERPL-MCNC: 38 NG/ML (ref 30–100)
ALBUMIN SERPL BCP-MCNC: 4.5 G/DL (ref 3.2–4.9)
ALBUMIN/GLOB SERPL: 1.6 G/DL
ALP SERPL-CCNC: 51 U/L (ref 30–99)
ALT SERPL-CCNC: 11 U/L (ref 2–50)
ANION GAP SERPL CALC-SCNC: 10 MMOL/L (ref 7–16)
AST SERPL-CCNC: 19 U/L (ref 12–45)
BASOPHILS # BLD AUTO: 1.3 % (ref 0–1.8)
BASOPHILS # BLD: 0.08 K/UL (ref 0–0.12)
BILIRUB SERPL-MCNC: 0.2 MG/DL (ref 0.1–1.5)
BUN SERPL-MCNC: 14 MG/DL (ref 8–22)
CALCIUM ALBUM COR SERPL-MCNC: 9.2 MG/DL (ref 8.5–10.5)
CALCIUM SERPL-MCNC: 9.6 MG/DL (ref 8.5–10.5)
CHLORIDE SERPL-SCNC: 105 MMOL/L (ref 96–112)
CHOLEST SERPL-MCNC: 182 MG/DL (ref 100–199)
CO2 SERPL-SCNC: 26 MMOL/L (ref 20–33)
CREAT SERPL-MCNC: 0.86 MG/DL (ref 0.5–1.4)
EOSINOPHIL # BLD AUTO: 0.33 K/UL (ref 0–0.51)
EOSINOPHIL NFR BLD: 5.2 % (ref 0–6.9)
ERYTHROCYTE [DISTWIDTH] IN BLOOD BY AUTOMATED COUNT: 45.3 FL (ref 35.9–50)
GFR SERPLBLD CREATININE-BSD FMLA CKD-EPI: 87 ML/MIN/1.73 M 2
GLOBULIN SER CALC-MCNC: 2.9 G/DL (ref 1.9–3.5)
GLUCOSE SERPL-MCNC: 99 MG/DL (ref 65–99)
HBV SURFACE AB SERPL IA-ACNC: 324 MIU/ML (ref 0–10)
HCT VFR BLD AUTO: 44.5 % (ref 37–47)
HDLC SERPL-MCNC: 82 MG/DL
HGB BLD-MCNC: 14.5 G/DL (ref 12–16)
IMM GRANULOCYTES # BLD AUTO: 0.02 K/UL (ref 0–0.11)
IMM GRANULOCYTES NFR BLD AUTO: 0.3 % (ref 0–0.9)
LDLC SERPL CALC-MCNC: 90 MG/DL
LYMPHOCYTES # BLD AUTO: 2.19 K/UL (ref 1–4.8)
LYMPHOCYTES NFR BLD: 34.2 % (ref 22–41)
MAGNESIUM SERPL-MCNC: 2.1 MG/DL (ref 1.5–2.5)
MCH RBC QN AUTO: 30.7 PG (ref 27–33)
MCHC RBC AUTO-ENTMCNC: 32.6 G/DL (ref 33.6–35)
MCV RBC AUTO: 94.1 FL (ref 81.4–97.8)
MONOCYTES # BLD AUTO: 0.5 K/UL (ref 0–0.85)
MONOCYTES NFR BLD AUTO: 7.8 % (ref 0–13.4)
NEUTROPHILS # BLD AUTO: 3.28 K/UL (ref 2–7.15)
NEUTROPHILS NFR BLD: 51.2 % (ref 44–72)
NRBC # BLD AUTO: 0 K/UL
NRBC BLD-RTO: 0 /100 WBC
PLATELET # BLD AUTO: 249 K/UL (ref 164–446)
PMV BLD AUTO: 11.4 FL (ref 9–12.9)
POTASSIUM SERPL-SCNC: 5.1 MMOL/L (ref 3.6–5.5)
PROT SERPL-MCNC: 7.4 G/DL (ref 6–8.2)
RBC # BLD AUTO: 4.73 M/UL (ref 4.2–5.4)
SODIUM SERPL-SCNC: 141 MMOL/L (ref 135–145)
TRIGL SERPL-MCNC: 50 MG/DL (ref 0–149)
TSH SERPL DL<=0.005 MIU/L-ACNC: 1.79 UIU/ML (ref 0.38–5.33)
VIT B12 SERPL-MCNC: 519 PG/ML (ref 211–911)
WBC # BLD AUTO: 6.4 K/UL (ref 4.8–10.8)

## 2023-05-15 PROCEDURE — 82306 VITAMIN D 25 HYDROXY: CPT

## 2023-05-15 PROCEDURE — 83735 ASSAY OF MAGNESIUM: CPT

## 2023-05-15 PROCEDURE — 80053 COMPREHEN METABOLIC PANEL: CPT

## 2023-05-15 PROCEDURE — 85025 COMPLETE CBC W/AUTO DIFF WBC: CPT

## 2023-05-15 PROCEDURE — 82607 VITAMIN B-12: CPT

## 2023-05-15 PROCEDURE — 36415 COLL VENOUS BLD VENIPUNCTURE: CPT

## 2023-05-15 PROCEDURE — 84443 ASSAY THYROID STIM HORMONE: CPT

## 2023-05-15 PROCEDURE — 77063 BREAST TOMOSYNTHESIS BI: CPT

## 2023-05-15 PROCEDURE — 80061 LIPID PANEL: CPT

## 2023-05-15 PROCEDURE — 1126F AMNT PAIN NOTED NONE PRSNT: CPT

## 2023-05-15 PROCEDURE — 86706 HEP B SURFACE ANTIBODY: CPT

## 2023-05-31 ENCOUNTER — TELEMEDICINE (OUTPATIENT)
Dept: MEDICAL GROUP | Facility: PHYSICIAN GROUP | Age: 38
End: 2023-05-31
Payer: COMMERCIAL

## 2023-05-31 VITALS — HEIGHT: 69 IN | HEART RATE: 62 BPM | OXYGEN SATURATION: 99 % | BODY MASS INDEX: 20.23 KG/M2

## 2023-05-31 DIAGNOSIS — F51.09 SITUATIONAL INSOMNIA: ICD-10-CM

## 2023-05-31 DIAGNOSIS — K21.9 GASTROESOPHAGEAL REFLUX DISEASE WITHOUT ESOPHAGITIS: ICD-10-CM

## 2023-05-31 PROCEDURE — 99214 OFFICE O/P EST MOD 30 MIN: CPT | Mod: 95

## 2023-05-31 RX ORDER — PANTOPRAZOLE SODIUM 40 MG/1
40 TABLET, DELAYED RELEASE ORAL DAILY
Qty: 90 TABLET | Refills: 2 | Status: SHIPPED | OUTPATIENT
Start: 2023-05-31 | End: 2024-03-20 | Stop reason: SDUPTHER

## 2023-05-31 RX ORDER — ZOLPIDEM TARTRATE 5 MG/1
TABLET ORAL
Qty: 30 TABLET | Refills: 0 | Status: SHIPPED | OUTPATIENT
Start: 2023-05-31 | End: 2024-03-20 | Stop reason: SDUPTHER

## 2023-05-31 NOTE — PROGRESS NOTES
Virtual Visit: Established Patient   This visit was conducted via Zoom using secure and encrypted videoconferencing technology.   The patient was in a private location outside of their home in the state [facility].    The patient's identity was confirmed and verbal consent was obtained for this virtual visit.    Subjective:   CC:   Chief Complaint   Patient presents with    Medication Refill     Ambien, pantoprazole     Gogo Wong is a 38 y.o. 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  Primary insomnia has been present for approximately 5-6 years.  She utilizes Ambien on an as-needed basis when she works nights. This medication works well for her with no reported side effects.  She is requesting refill for this medication.     Gerd (Gastroesophageal Reflux Disease)    Chronic condition for which patient is taking pantoprazole 40 mg daily.  She is doing well on this medication without reported side effects.  Requesting refill.               ROS   Negative ROS    Current medicines (including changes today)  Current Outpatient Medications   Medication Sig Dispense Refill    zolpidem (AMBIEN) 5 MG Tab TAKE ONE TABLET OP AT BEDTIME AS NEEDED FOR SLEEP FOR DAYS *F51.09 (10 TABS/30 DAY SUPPLY PER MD OFFICE) 30 Tablet 0    pantoprazole (PROTONIX) 40 MG Tablet Delayed Response Take 1 Tablet by mouth every day. 90 Tablet 2    Melatonin 1 MG/4ML Liquid       propranolol (INDERAL) 20 MG Tab Take 1 Tab by mouth 1 time daily as needed. 60 Tab 0    ibuprofen (MOTRIN) 600 MG Tab Take 600 mg by mouth every 6 hours as needed.       No current facility-administered medications for this visit.       Patient Active Problem List    Diagnosis Date Noted    Situational insomnia 12/30/2021    Bradycardia, sinus 03/15/2017    Patellofemoral  "syndrome 10/06/2015    GERD (gastroesophageal reflux disease) 10/06/2015    Performance anxiety 10/06/2015    Raynaud's disease 10/06/2015    IBS (irritable bowel syndrome) 10/06/2015        Objective:   Pulse 62   Ht 1.753 m (5' 9\")   SpO2 99%   BMI 20.23 kg/m²     Physical Exam:  Constitutional: Alert, no distress, well-groomed.  Skin: No rashes in visible areas.  Eye: Round. Conjunctiva clear, lids normal. No icterus.   ENMT: Lips pink without lesions, good dentition, moist mucous membranes. Phonation normal.  Neck: No masses, no thyromegaly. Moves freely without pain.  Respiratory: Unlabored respiratory effort, no cough or audible wheeze  Psych: Alert and oriented x3, normal affect and mood.     Assessment and Plan:   The following treatment plan was discussed:   1. Situational insomnia  Controlled substance visit today for insomnia.  Patient has been stable on Ambien 5 mg as needed nightly.   shows no abnormal prescribing or filling behavior.  Controlled substance agreement up-to-date.  Urine drug screen up-to-date.  -Ambien refilled, risk benefits and side effects of this medication discussed   -Controlled substance discussed with client. Client agrees to abide by controlled substance contract.   -Patient informed no medication adjustments will be made to titrate up or add additional narcotics, benzodiazepines or other controlled substances to this regimen.  Referral to pain management or behavioral health will be made as appropriate.    Patient understands this prescription is a controlled substance which is potentially habit-forming and its use is regulated by the [employer]. We also discussed the new \"black box\" warning regarding the lethal combination of opioids and benzodiazepines. Refills are subject to terms of a controlled substance agreement and patient has an updated one on file. Most recent UDS is appropriate. Any refill requires an office visit. Narcotics, benzodiazepines, stimulants, and sleep aids " may have adverse effects and the risks of addiction, accidental overdose and death were emphasized. Provided prescriptions for the next three months.    - zolpidem (AMBIEN) 5 MG Tab; TAKE ONE TABLET OP AT BEDTIME AS NEEDED FOR SLEEP FOR DAYS *F51.09 (10 TABS/30 DAY SUPPLY PER MD OFFICE)  Dispense: 30 Tablet; Refill: 0    2. Gastroesophageal reflux disease without esophagitis  Chronic condition stable  -Antireflux measures:  Eat smaller portions.  Avoid lying down after meals  Normalize body weight  Avoid common reflux triggers such as spicy, greasy foods, peppers, onions.  Avoid caffeine, carbonation, alcohol, tobacco.  Raise the head of your bed by placing bricks or phone books between the floor and the legs of the headboard.   Use OTC medicines such as TUMS, Rolaids, Maalox, Gaviscon, Zantac, Pepcid, Tagamet     - pantoprazole (PROTONIX) 40 MG Tablet Delayed Response; Take 1 Tablet by mouth every day.  Dispense: 90 Tablet; Refill: 2    Follow-up: Return in about 6 months (around 11/30/2023).

## 2023-06-05 ENCOUNTER — HOSPITAL ENCOUNTER (OUTPATIENT)
Dept: RADIOLOGY | Facility: MEDICAL CENTER | Age: 38
End: 2023-06-05
Payer: COMMERCIAL

## 2023-06-05 DIAGNOSIS — R92.8 ABNORMAL MAMMOGRAM: ICD-10-CM

## 2023-06-05 PROCEDURE — 76642 ULTRASOUND BREAST LIMITED: CPT | Mod: LT

## 2023-06-05 PROCEDURE — G0279 TOMOSYNTHESIS, MAMMO: HCPCS

## 2023-06-06 ENCOUNTER — HOSPITAL ENCOUNTER (OUTPATIENT)
Dept: RADIOLOGY | Facility: MEDICAL CENTER | Age: 38
End: 2023-06-06
Attending: NURSE PRACTITIONER
Payer: COMMERCIAL

## 2023-06-06 ENCOUNTER — HOSPITAL ENCOUNTER (OUTPATIENT)
Dept: LAB | Facility: MEDICAL CENTER | Age: 38
End: 2023-06-06
Attending: NURSE PRACTITIONER
Payer: COMMERCIAL

## 2023-06-06 ENCOUNTER — OFFICE VISIT (OUTPATIENT)
Dept: URGENT CARE | Facility: CLINIC | Age: 38
End: 2023-06-06
Payer: COMMERCIAL

## 2023-06-06 VITALS
DIASTOLIC BLOOD PRESSURE: 70 MMHG | TEMPERATURE: 98.3 F | BODY MASS INDEX: 21.55 KG/M2 | RESPIRATION RATE: 18 BRPM | HEIGHT: 69 IN | SYSTOLIC BLOOD PRESSURE: 110 MMHG | OXYGEN SATURATION: 98 % | HEART RATE: 76 BPM | WEIGHT: 145.5 LBS

## 2023-06-06 DIAGNOSIS — R10.9 LEFT SIDED ABDOMINAL PAIN: ICD-10-CM

## 2023-06-06 DIAGNOSIS — R10.30 LOWER ABDOMINAL PAIN: ICD-10-CM

## 2023-06-06 DIAGNOSIS — R10.9 LEFT FLANK PAIN: ICD-10-CM

## 2023-06-06 LAB
ALBUMIN SERPL BCP-MCNC: 4.4 G/DL (ref 3.2–4.9)
ALBUMIN/GLOB SERPL: 1.7 G/DL
ALP SERPL-CCNC: 47 U/L (ref 30–99)
ALT SERPL-CCNC: 15 U/L (ref 2–50)
AMYLASE SERPL-CCNC: 99 U/L (ref 20–103)
ANION GAP SERPL CALC-SCNC: 10 MMOL/L (ref 7–16)
APPEARANCE UR: CLEAR
AST SERPL-CCNC: 25 U/L (ref 12–45)
BASOPHILS # BLD AUTO: 0.4 % (ref 0–1.8)
BASOPHILS # BLD: 0.04 K/UL (ref 0–0.12)
BILIRUB SERPL-MCNC: 0.3 MG/DL (ref 0.1–1.5)
BILIRUB UR STRIP-MCNC: NORMAL MG/DL
BUN SERPL-MCNC: 10 MG/DL (ref 8–22)
CALCIUM ALBUM COR SERPL-MCNC: 9.1 MG/DL (ref 8.5–10.5)
CALCIUM SERPL-MCNC: 9.4 MG/DL (ref 8.5–10.5)
CHLORIDE SERPL-SCNC: 108 MMOL/L (ref 96–112)
CO2 SERPL-SCNC: 24 MMOL/L (ref 20–33)
COLOR UR AUTO: YELLOW
CREAT SERPL-MCNC: 0.72 MG/DL (ref 0.5–1.4)
EOSINOPHIL # BLD AUTO: 0.18 K/UL (ref 0–0.51)
EOSINOPHIL NFR BLD: 1.8 % (ref 0–6.9)
ERYTHROCYTE [DISTWIDTH] IN BLOOD BY AUTOMATED COUNT: 44.9 FL (ref 35.9–50)
GFR SERPLBLD CREATININE-BSD FMLA CKD-EPI: 108 ML/MIN/1.73 M 2
GLOBULIN SER CALC-MCNC: 2.6 G/DL (ref 1.9–3.5)
GLUCOSE SERPL-MCNC: 99 MG/DL (ref 65–99)
GLUCOSE UR STRIP.AUTO-MCNC: NORMAL MG/DL
HCT VFR BLD AUTO: 40 % (ref 37–47)
HGB BLD-MCNC: 13.1 G/DL (ref 12–16)
IMM GRANULOCYTES # BLD AUTO: 0.03 K/UL (ref 0–0.11)
IMM GRANULOCYTES NFR BLD AUTO: 0.3 % (ref 0–0.9)
KETONES UR STRIP.AUTO-MCNC: NORMAL MG/DL
LEUKOCYTE ESTERASE UR QL STRIP.AUTO: NORMAL
LIPASE SERPL-CCNC: 30 U/L (ref 11–82)
LYMPHOCYTES # BLD AUTO: 1.66 K/UL (ref 1–4.8)
LYMPHOCYTES NFR BLD: 16.8 % (ref 22–41)
MCH RBC QN AUTO: 30.5 PG (ref 27–33)
MCHC RBC AUTO-ENTMCNC: 32.8 G/DL (ref 32.2–35.5)
MCV RBC AUTO: 93.2 FL (ref 81.4–97.8)
MONOCYTES # BLD AUTO: 0.48 K/UL (ref 0–0.85)
MONOCYTES NFR BLD AUTO: 4.9 % (ref 0–13.4)
NEUTROPHILS # BLD AUTO: 7.49 K/UL (ref 1.82–7.42)
NEUTROPHILS NFR BLD: 75.8 % (ref 44–72)
NITRITE UR QL STRIP.AUTO: NORMAL
NRBC # BLD AUTO: 0 K/UL
NRBC BLD-RTO: 0 /100 WBC (ref 0–0.2)
PH UR STRIP.AUTO: 7 [PH] (ref 5–8)
PLATELET # BLD AUTO: 240 K/UL (ref 164–446)
PMV BLD AUTO: 11.1 FL (ref 9–12.9)
POTASSIUM SERPL-SCNC: 4.9 MMOL/L (ref 3.6–5.5)
PROT SERPL-MCNC: 7 G/DL (ref 6–8.2)
PROT UR QL STRIP: NORMAL MG/DL
RBC # BLD AUTO: 4.29 M/UL (ref 4.2–5.4)
RBC UR QL AUTO: NORMAL
SODIUM SERPL-SCNC: 142 MMOL/L (ref 135–145)
SP GR UR STRIP.AUTO: 1.01
UROBILINOGEN UR STRIP-MCNC: 0.2 MG/DL
WBC # BLD AUTO: 9.9 K/UL (ref 4.8–10.8)

## 2023-06-06 PROCEDURE — 700117 HCHG RX CONTRAST REV CODE 255: Performed by: NURSE PRACTITIONER

## 2023-06-06 PROCEDURE — 80053 COMPREHEN METABOLIC PANEL: CPT

## 2023-06-06 PROCEDURE — 82150 ASSAY OF AMYLASE: CPT

## 2023-06-06 PROCEDURE — 36415 COLL VENOUS BLD VENIPUNCTURE: CPT

## 2023-06-06 PROCEDURE — 3074F SYST BP LT 130 MM HG: CPT | Performed by: NURSE PRACTITIONER

## 2023-06-06 PROCEDURE — 74177 CT ABD & PELVIS W/CONTRAST: CPT

## 2023-06-06 PROCEDURE — 83690 ASSAY OF LIPASE: CPT

## 2023-06-06 PROCEDURE — 85025 COMPLETE CBC W/AUTO DIFF WBC: CPT

## 2023-06-06 PROCEDURE — 99213 OFFICE O/P EST LOW 20 MIN: CPT | Performed by: NURSE PRACTITIONER

## 2023-06-06 PROCEDURE — 3078F DIAST BP <80 MM HG: CPT | Performed by: NURSE PRACTITIONER

## 2023-06-06 PROCEDURE — 81002 URINALYSIS NONAUTO W/O SCOPE: CPT | Performed by: NURSE PRACTITIONER

## 2023-06-06 RX ADMIN — IOHEXOL 100 ML: 350 INJECTION, SOLUTION INTRAVENOUS at 15:53

## 2023-06-06 ASSESSMENT — ENCOUNTER SYMPTOMS
NAUSEA: 1
ABDOMINAL PAIN: 1
VOMITING: 0
FEVER: 0
SHORTNESS OF BREATH: 0
FLANK PAIN: 1
BLOOD IN STOOL: 0
CONSTIPATION: 0
DIARRHEA: 0
CHILLS: 0

## 2023-06-06 ASSESSMENT — FIBROSIS 4 INDEX: FIB4 SCORE: 0.92

## 2023-06-06 NOTE — PROGRESS NOTES
Subjective     Gogo Wong is a 38 y.o. female who presents with Abdominal Pain (Pt reports she has been experiencing LUQ abdominal pain, possibely radiating to her LT flank. Pt reports pain is intermittent and has happened twice. Pt reports Sx last about 5 minutes in duration with episodes lasting about 3hrs. )            Gogo comes in today with a complaint of abdominal pain. She reports acute onset of symptoms overnight with sudden sharp pain, primarily in the left flank.  The pain seems to radiate to the left lower abdomen and then diffusely across the entire lower abdomen.  Pain comes in waves and lasts approximately 5 minutes before abating and then returning again.  She notes nausea and felt hot but is not currently febrile.  She denies any vomiting, diarrhea, constipation, bloody/mucus/tarry stools.  She is currently on last day of menstrual cycle.  She denies any dysuria, hematuria, urgency or frequency.  No history of kidney stones or family history of kidney stones.  She does have a history of IBS.  She reports 1 historic episode of similar pain last month that resolved without intervention.  She rates current pain 2/10, up to 7/10 at its worst.  She reports less frequent  waves of pain in the past few hours.  Not taking any meds to treat the symptoms.  She had an unremarkable abdominal ultrasound in 2019 for postprandial RUQ pain.  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 She reports she frequently travels into remote [facility] areas where there is limited or no access to rapid medical care. She is amenable to CT imaging today.        Review of Systems   Constitutional:  Positive for malaise/fatigue. Negative for chills and fever.   Respiratory:  Negative for shortness of breath.    Cardiovascular:  Negative for chest pain.   Gastrointestinal:  Positive for abdominal pain and nausea. Negative for blood in stool, constipation, diarrhea, melena and vomiting.   Genitourinary:  Positive for  "flank pain. Negative for dysuria, frequency, hematuria and urgency.     Medications, Allergies, and current problem list reviewed today in Epic         Objective     Blood Pressure 110/70   Pulse 76   Temperature 36.8 °C (98.3 °F) (Temporal)   Respiration 18   Height 1.753 m (5' 9\")   Weight 66 kg (145 lb 8.1 oz)   Oxygen Saturation 98%   Body Mass Index 21.49 kg/m²      Physical Exam  Vitals reviewed.   Constitutional:       General: She is not in acute distress.     Appearance: Normal appearance. She is well-developed. She is not ill-appearing, toxic-appearing or diaphoretic.   Cardiovascular:      Rate and Rhythm: Normal rate and regular rhythm.      Heart sounds: Normal heart sounds. No murmur heard.     No friction rub. No gallop.   Pulmonary:      Effort: Pulmonary effort is normal. No respiratory distress.      Breath sounds: Normal breath sounds. No stridor. No wheezing, rhonchi or rales.   Chest:      Chest wall: No tenderness.   Abdominal:      General: Abdomen is flat. Bowel sounds are normal. There is no distension or abdominal bruit. There are no signs of injury.      Palpations: Abdomen is soft. Abdomen is not rigid. There is no shifting dullness, fluid wave, hepatomegaly, splenomegaly, mass or pulsatile mass.      Tenderness: There is abdominal tenderness in the right lower quadrant, suprapubic area and left lower quadrant. There is left CVA tenderness. There is no right CVA tenderness, guarding or rebound. Negative signs include Mike's sign and McBurney's sign.      Comments: Mildly painful left flank with no CVA pain on percussion.  Pain extends into left lower abdomen and to a lesser degree the suprapubic region and right lower abdomen.  No bruising, swelling, erythema, rash or lesion.     Skin:     General: Skin is warm and dry.      Coloration: Skin is not jaundiced or pale.      Findings: No erythema or rash.   Neurological:      Mental Status: She is alert and oriented to person, " place, and time.     POCT Urinalysis  Order: [id number]  Status: Final result     Visible to patient: Yes (not seen)     Next appt: Today at 10:30 AM in Lab (Lab [facility])     Dx: Left flank pain     0 Result Notes       Component Ref Range & Units  8:35 AM   POC Color Negative YELLOW    POC Appearance Negative CLEAR    POC Glucose Negative mg/dL NEG    POC Bilirubin Negative mg/dL NEG    POC Ketones Negative mg/dL NEG    POC Specific Gravity <1.005 - >1.030 1.010    POC Blood Negative NEG    POC Urine PH 5.0 - 8.0 7.0    POC Protein Negative mg/dL NEG    POC Urobiligen Negative (0.2) mg/dL 0.2    POC Nitrites Negative NEG    POC Leukocyte Esterase Negative NEG    Resulting Agency  [facility]              Specimen Collected: 06/06/23  8:35 AM Last Resulted: 06/06/23  8:35 AM           Contains abnormal data CBC WITH DIFFERENTIAL  Order: [id number]  Status: Final result     Visible to patient: Yes (seen)     Next appt: None     Dx: Left flank pain; Lower abdominal pain...     0 Result Notes          Component Ref Range & Units  9:06 AM 3 wk ago 3 yr ago 5 yr ago   WBC 4.8 - 10.8 K/uL 9.9  6.4  6.1  5.5    RBC 4.20 - 5.40 M/uL 4.29  4.73  4.46  4.62    Hemoglobin 12.0 - 16.0 g/dL 13.1  14.5  13.0  13.3    Hematocrit 37.0 - 47.0 % 40.0  44.5  41.2  41.0    MCV 81.4 - 97.8 fL 93.2  94.1  92.4  88.7    MCH 27.0 - 33.0 pg 30.5  30.7  29.1  28.8    MCHC 32.2 - 35.5 g/dL 32.8  32.6 Low  R  31.6 Low  R  32.4 Low  R    Comment: Please note new reference range effective 05/22/2023.   RDW 35.9 - 50.0 fL 44.9  45.3  49.1  49.8    Platelet Count 164 - 446 K/uL 240  249  238  248    MPV 9.0 - 12.9 fL 11.1  11.4  11.4  11.8    Neutrophils-Polys 44.00 - 72.00 % 75.80 High   51.20  62.50  56.50    Lymphocytes 22.00 - 41.00 % 16.80 Low   34.20  30.80  33.00    Monocytes 0.00 - 13.40 % 4.90  7.80  4.40  7.00    Eosinophils 0.00 - 6.90 % 1.80  5.20  1.60  2.00    Basophils 0.00 - 1.80 % 0.40  1.30  0.50  1.30    Immature Granulocytes  0.00 - 0.90 % 0.30  0.30  0.20  0.20    Nucleated RBC 0.00 - 0.20 /100 WBC 0.00  0.00 R  0.00 R  0.00 R    Comment: Please note new reference range effective 05/22/2023.   Neutrophils (Absolute) 1.82 - 7.42 K/uL 7.49 High   3.28 R, CM  3.79 R, CM  3.08 R, CM    Comment: Includes immature neutrophils, if present.   Please note new reference range effective 05/22/2023.    Lymphs (Absolute) 1.00 - 4.80 K/uL 1.66  2.19  1.87  1.80    Monos (Absolute) 0.00 - 0.85 K/uL 0.48  0.50  0.27  0.38    Eos (Absolute) 0.00 - 0.51 K/uL 0.18  0.33  0.10  0.11    Baso (Absolute) 0.00 - 0.12 K/uL 0.04  0.08  0.03  0.07    Immature Granulocytes (abs) 0.00 - 0.11 K/uL 0.03  0.02  0.01  0.01    NRBC (Absolute) K/uL 0.00  0.00  0.00  0.00    Resulting Agency  [facility]           Comp Metabolic Panel  Order: [id number]  Status: Final result     Visible to patient: Yes (seen)     Next appt: None     Dx: Left flank pain; Lower abdominal pain...     0 Result Notes          Component Ref Range & Units  9:06 AM 3 wk ago 3 yr ago 5 yr ago   Sodium 135 - 145 mmol/L 142  141  140  137    Potassium 3.6 - 5.5 mmol/L 4.9  5.1  3.7  3.7    Chloride 96 - 112 mmol/L 108  105  105  106    Co2 20 - 33 mmol/L 24  26  27  23    Anion Gap 7.0 - 16.0 10.0  10.0  8.0 R  8.0 R    Glucose 65 - 99 mg/dL 99  99  89  87    Bun 8 - 22 mg/dL 10  14  10  12    Creatinine 0.50 - 1.40 mg/dL 0.72  0.86  0.76  0.71    Calcium 8.5 - 10.5 mg/dL 9.4  9.6  9.4  9.2    AST(SGOT) 12 - 45 U/L 25  19  18  21    ALT(SGPT) 2 - 50 U/L 15  11  14  12    Alkaline Phosphatase 30 - 99 U/L 47  51  39  32    Total Bilirubin 0.1 - 1.5 mg/dL 0.3  0.2  0.4  0.5    Albumin 3.2 - 4.9 g/dL 4.4  4.5  4.5  4.5    Total Protein 6.0 - 8.2 g/dL 7.0  7.4  7.4  7.2    Globulin 1.9 - 3.5 g/dL 2.6  2.9  2.9  2.7    A-G Ratio g/dL 1.7  1.6  1.6  1.7    Resulting Agency  [facility]            LIPASE  Order: [id number]  Status: Final result     Visible to patient: Yes (seen)     Next appt: None     Dx: Left flank  pain; Lower abdominal pain...     0 Result Notes        Component Ref Range & Units  9:06 AM 3 yr ago   Lipase 11 - 82 U/L 30  18    Resulting Agency  [facility]          AMYLASE  Order: [id number]  Status: Final result     Visible to patient: Yes (seen)     Next appt: None     Dx: Left flank pain; Lower abdominal pain...     0 Result Notes        Component Ref Range & Units  9:06 AM 3 yr ago   Amylase 20 - 103 U/L 99  73    Resulting Agency  [facility]          CORRECTED CALCIUM  Order: [id number] - Reflex for Order [id number]  Status: Final result     Visible to patient: Yes (seen)     Next appt: None     0 Result Notes        Component Ref Range & Units  9:06 AM 3 wk ago   Correct Calcium 8.5 - 10.5 mg/dL 9.1  9.2    Resulting Agency  [facility]           ESTIMATED GFR  Order: [id number] - Reflex for Order [id number]  Status: Final result     Visible to patient: Yes (seen)     Next appt: None     0 Result Notes        Component Ref Range & Units  9:06 AM 3 wk ago   GFR (CKD-EPI) >60 mL/min/1.73 m 2 108  87 CM    Comment: Estimated Glomerular Filtration Rate is calculated using   race neutral CKD-EPI 2021 equation per NKF-ASN recommendations.    Resulting Agency  [facility]           CT-ABDOMEN-PELVIS WITH  Order: [id number]  Status: Final result     Visible to patient: Yes (seen)     Next appt: None     Dx: Left flank pain; Left sided abdominal...     0 Result Notes  Details    Reading Physician Reading Date Result Priority   Krishan Mills M.D.  [phone] 6/6/2023 STAT     Narrative & Impression     6/6/2023 3:28 PM     HISTORY/REASON FOR EXAM:  Abdominal pain..        TECHNIQUE/EXAM DESCRIPTION:   CT scan of the abdomen and pelvis with contrast.     Contrast-enhanced helical scanning was obtained from the diaphragmatic domes through the pubic symphysis following the bolus administration of nonionic contrast without complication.     100 mL of Omnipaque 350 nonionic contrast was administered without complication.     Low dose optimization  technique was utilized for this CT exam including automated exposure control and adjustment of the mA and/or kV according to patient size.     COMPARISON: No prior studies available.     FINDINGS:  Lower Chest: Unremarkable.     Liver: Normal.     Spleen: Unremarkable.     Pancreas: Unremarkable.     Gallbladder: No calcified stones.     Biliary: Nondilated.     Adrenal glands: Normal.     Kidneys: Unremarkable without hydronephrosis.     Bowel: No obstruction or acute inflammation.     Lymph nodes: No adenopathy.     Vasculature: Unremarkable.     Musculoskeletal: No acute or destructive process.     Pelvis: No adenopathy or free fluid.           IMPRESSION:     No evidence of bowel obstruction or focal inflammatory change.           Exam Ended: 06/06/23  3:50 PM Last Resulted: 06/06/23  4:12 PM                      Assessment & Plan        1. Left flank pain    2. Left sided abdominal pain    3. Lower abdominal pain       Discussed exam findings with Gogo.  Unremarkable Abdominal/pelvic CT.  Low lymphocytes (16.8) on CBC but otherwise at or near within normal parameters for all other CBC components, CMP, Amylase, lipase, GFR, and corrected calcium.  No clear etiology.  As she is trending better with minimal discomfort, will proceed with watchful waiting and OTC analgesics prn pain.  She is established with GI due to history of GERD and IBS and will follow up there and also with PCP for any recurrent symptoms.  She agrees with this plan of care.

## 2023-08-18 ENCOUNTER — TELEPHONE (OUTPATIENT)
Dept: MEDICAL GROUP | Facility: PHYSICIAN GROUP | Age: 38
End: 2023-08-18
Payer: COMMERCIAL

## 2023-08-18 NOTE — TELEPHONE ENCOUNTER
DOCUMENTATION OF PAR STATUS:    1. Name of Medication & Dose: Pantoprazole Sodium 40MG dr tablets     2. Name of Prescription Coverage Company & phone #: [employer]    3. Date Prior Auth Submitted: 08/18/2023    4. What information was given to obtain insurance decision? ICD    5. Prior Auth Status? Pending    6. Patient Notified: no

## 2023-08-18 NOTE — TELEPHONE ENCOUNTER
FINAL PRIOR AUTHORIZATION STATUS:    1.  Name of Medication & Dose: Pantoprazole Sodium 40MG dr tablets     2. Prior Auth Status: Approved through [employer]      3. Action Taken: Pharmacy Notified: no Patient Notified: no

## 2023-08-23 ENCOUNTER — TELEPHONE (OUTPATIENT)
Dept: MEDICAL GROUP | Facility: PHYSICIAN GROUP | Age: 38
End: 2023-08-23
Payer: COMMERCIAL

## 2023-08-23 NOTE — TELEPHONE ENCOUNTER
FINAL PRIOR AUTHORIZATION STATUS:    1.  Name of Medication & Dose: Pantoprazole     2. Prior Auth Status: Approved through 08/17/2024     3. Action Taken: Pharmacy Notified: no Patient Notified: no

## 2023-11-27 ENCOUNTER — HOSPITAL ENCOUNTER (OUTPATIENT)
Dept: RADIOLOGY | Facility: MEDICAL CENTER | Age: 38
End: 2023-11-27
Payer: COMMERCIAL

## 2023-11-27 DIAGNOSIS — R92.8 ABNORMAL FINDINGS ON DIAGNOSTIC IMAGING OF BREAST: ICD-10-CM

## 2023-11-27 PROCEDURE — G0279 TOMOSYNTHESIS, MAMMO: HCPCS

## 2024-03-20 ENCOUNTER — HOSPITAL ENCOUNTER (OUTPATIENT)
Facility: MEDICAL CENTER | Age: 39
End: 2024-03-20
Payer: COMMERCIAL

## 2024-03-20 ENCOUNTER — OFFICE VISIT (OUTPATIENT)
Dept: MEDICAL GROUP | Facility: PHYSICIAN GROUP | Age: 39
End: 2024-03-20
Payer: COMMERCIAL

## 2024-03-20 VITALS
HEIGHT: 69 IN | SYSTOLIC BLOOD PRESSURE: 90 MMHG | OXYGEN SATURATION: 99 % | BODY MASS INDEX: 20.29 KG/M2 | HEART RATE: 59 BPM | TEMPERATURE: 97.9 F | WEIGHT: 137 LBS | DIASTOLIC BLOOD PRESSURE: 60 MMHG

## 2024-03-20 DIAGNOSIS — I73.00 RAYNAUD'S DISEASE WITHOUT GANGRENE: ICD-10-CM

## 2024-03-20 DIAGNOSIS — Z13.21 ENCOUNTER FOR VITAMIN DEFICIENCY SCREENING: ICD-10-CM

## 2024-03-20 DIAGNOSIS — Z51.81 THERAPEUTIC DRUG MONITORING: ICD-10-CM

## 2024-03-20 DIAGNOSIS — Z00.00 HEALTHCARE MAINTENANCE: ICD-10-CM

## 2024-03-20 DIAGNOSIS — F51.09 SITUATIONAL INSOMNIA: ICD-10-CM

## 2024-03-20 DIAGNOSIS — K21.9 GASTROESOPHAGEAL REFLUX DISEASE WITHOUT ESOPHAGITIS: ICD-10-CM

## 2024-03-20 PROCEDURE — 3078F DIAST BP <80 MM HG: CPT

## 2024-03-20 PROCEDURE — 3074F SYST BP LT 130 MM HG: CPT

## 2024-03-20 PROCEDURE — G0481 DRUG TEST DEF 8-14 CLASSES: HCPCS

## 2024-03-20 PROCEDURE — 99214 OFFICE O/P EST MOD 30 MIN: CPT

## 2024-03-20 RX ORDER — PANTOPRAZOLE SODIUM 40 MG/1
40 TABLET, DELAYED RELEASE ORAL DAILY
Qty: 90 TABLET | Refills: 3 | Status: SHIPPED | OUTPATIENT
Start: 2024-03-20

## 2024-03-20 RX ORDER — ZOLPIDEM TARTRATE 5 MG/1
TABLET ORAL
Qty: 30 TABLET | Refills: 0 | Status: SHIPPED | OUTPATIENT
Start: 2024-03-20 | End: 2024-03-25 | Stop reason: SDUPTHER

## 2024-03-20 ASSESSMENT — ENCOUNTER SYMPTOMS
COUGH: 0
NAUSEA: 0
BLURRED VISION: 0
CHILLS: 0
WEIGHT LOSS: 0
VOMITING: 0
FEVER: 0
ABDOMINAL PAIN: 0
WEAKNESS: 0
DIZZINESS: 0
CONSTIPATION: 0
HEADACHES: 0
MYALGIAS: 0
DIARRHEA: 0
SHORTNESS OF BREATH: 0

## 2024-03-20 ASSESSMENT — FIBROSIS 4 INDEX: FIB4 SCORE: 1.075828707279838024

## 2024-03-20 ASSESSMENT — PATIENT HEALTH QUESTIONNAIRE - PHQ9: CLINICAL INTERPRETATION OF PHQ2 SCORE: 0

## 2024-03-20 NOTE — ASSESSMENT & PLAN NOTE
"Chronic condition stable  Controlled substance visit today for insomnia.  Patient has been stable on Ambien 5 mg nightly if needed for shiftwork.   shows no abnormal prescribing or filling behavior.  Controlled substance agreement up-to-date.  Urine drug screen up-to-date.  -Ambien 5 mg refilled, risk benefits and side effects of this medication discussed   -Urine toxicology screen obtained in clinic  -Controlled substance abuse agreement contract signed and scanned into patient's chart  -Controlled substance discussed with client. Client agrees to abide by controlled substance contract.   -A Risk of Abuse assessment for opioid abuse was previously preformed and documented in the past medical history. The treatment plan was reviewed and discussed with the patient. The pharmacy monitoring report was requested and reviewed.  Patient is appropriate for refill of this medication.  -Patient informed no medication adjustments will be made to titrate up or add additional narcotics, benzodiazepines or other controlled substances to this regimen.  Referral to pain management or behavioral health will be made as appropriate.    Patient understands this prescription is a controlled substance which is potentially habit-forming and its use is regulated by the [employer]. We also discussed the new \"black box\" warning regarding the lethal combination of opioids and benzodiazepines. Refills are subject to terms of a controlled substance agreement and patient has an updated one on file. Most recent UDS is appropriate. Any refill requires an office visit. Narcotics, benzodiazepines, stimulants, and sleep aids may have adverse effects and the risks of addiction, accidental overdose and death were emphasized. Provided prescriptions for the next three months.    "

## 2024-03-20 NOTE — PROGRESS NOTES
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  She has had difficulty with obtaining adequate sleep during this schedule.  Primary insomnia has been present for approximately 9 years.  She utilizes Ambien on an as-needed basis when she works nights. This medication works well for her with no reported side effects.  She is requesting refill for this medication.     Gerd (Gastroesophageal Reflux Disease)    Chronic condition for which patient is taking pantoprazole 40 mg daily.  She is doing well on this medication without reported side effects.  Requesting refill.           ROS:  Review of Systems   Constitutional:  Negative for chills, fever, malaise/fatigue and weight loss.   Eyes:  Negative for blurred vision.   Respiratory:  Negative for cough and shortness of breath.    Cardiovascular:  Negative for chest pain.   Gastrointestinal:  Negative for abdominal pain, constipation, diarrhea, nausea and vomiting.   Musculoskeletal:  Negative for myalgias.   Neurological:  Negative for dizziness, weakness and headaches.       Objective:     Exam:  BP 90/60 (BP Location: Left arm, Patient Position: Sitting, BP Cuff Size: Adult)   Pulse (!) 59   Temp 36.6 °C (97.9 °F) (Temporal)   Ht 1.753 m (5' 9\")   Wt 62.1 kg (137 lb) Comment: tiffanie stated  SpO2 99%   BMI 20.23 kg/m²  Body mass index is 20.23 kg/m².    Physical Exam  Constitutional:       General: She is not in acute distress.     Appearance: Normal appearance. She is not ill-appearing or toxic-appearing.   HENT:      Head: Normocephalic.   Eyes:      Conjunctiva/sclera: Conjunctivae normal.   Pulmonary:      Effort: Pulmonary effort is normal.   Skin:     General: Skin is warm and dry.      Coloration: Skin is pale.   Neurological:      General: No focal deficit present.      Mental Status: She is " alert and oriented to person, place, and time.   Psychiatric:         Mood and Affect: Mood normal.         Behavior: Behavior normal.         Labs:     No visits with results within 1 Month(s) from this visit.   Latest known visit with results is:   Hospital Outpatient Visit on 06/06/2023   Component Date Value    Lipase 06/06/2023 30     Amylase 06/06/2023 99     Sodium 06/06/2023 142     Potassium 06/06/2023 4.9     Chloride 06/06/2023 108     Co2 06/06/2023 24     Anion Gap 06/06/2023 10.0     Glucose 06/06/2023 99     Bun 06/06/2023 10     Creatinine 06/06/2023 0.72     Calcium 06/06/2023 9.4     AST(SGOT) 06/06/2023 25     ALT(SGPT) 06/06/2023 15     Alkaline Phosphatase 06/06/2023 47     Total Bilirubin 06/06/2023 0.3     Albumin 06/06/2023 4.4     Total Protein 06/06/2023 7.0     Globulin 06/06/2023 2.6     A-G Ratio 06/06/2023 1.7     WBC 06/06/2023 9.9     RBC 06/06/2023 4.29     Hemoglobin 06/06/2023 13.1     Hematocrit 06/06/2023 40.0     MCV 06/06/2023 93.2     MCH 06/06/2023 30.5     MCHC 06/06/2023 32.8     RDW 06/06/2023 44.9     Platelet Count 06/06/2023 240     MPV 06/06/2023 11.1     Neutrophils-Polys 06/06/2023 75.80 (H)     Lymphocytes 06/06/2023 16.80 (L)     Monocytes 06/06/2023 4.90     Eosinophils 06/06/2023 1.80     Basophils 06/06/2023 0.40     Immature Granulocytes 06/06/2023 0.30     Nucleated RBC 06/06/2023 0.00     Neutrophils (Absolute) 06/06/2023 7.49 (H)     Lymphs (Absolute) 06/06/2023 1.66     Monos (Absolute) 06/06/2023 0.48     Eos (Absolute) 06/06/2023 0.18     Baso (Absolute) 06/06/2023 0.04     Immature Granulocytes (a* 06/06/2023 0.03     NRBC (Absolute) 06/06/2023 0.00     Correct Calcium 06/06/2023 9.1     GFR (CKD-EPI) 06/06/2023 108            Assessment & Plan: Medical Decision Making     38 y.o. female with the following -   Assessment & Plan      Problem List Items Addressed This Visit       GERD (gastroesophageal reflux disease)     Chronic conditions stable  "therefore we will continue pantoprazole 40 mg daily  -Labs ordered as requested         Relevant Medications    pantoprazole (PROTONIX) 40 MG Tablet Delayed Response    Raynaud's disease    Relevant Orders    Comp Metabolic Panel    CBC WITH DIFFERENTIAL    Situational insomnia     Chronic condition stable  Controlled substance visit today for insomnia.  Patient has been stable on Ambien 5 mg nightly if needed for shiftwork.   shows no abnormal prescribing or filling behavior.  Controlled substance agreement up-to-date.  Urine drug screen up-to-date.  -Ambien 5 mg refilled, risk benefits and side effects of this medication discussed   -Urine toxicology screen obtained in clinic  -Controlled substance abuse agreement contract signed and scanned into patient's chart  -Controlled substance discussed with client. Client agrees to abide by controlled substance contract.   -A Risk of Abuse assessment for opioid abuse was previously preformed and documented in the past medical history. The treatment plan was reviewed and discussed with the patient. The pharmacy monitoring report was requested and reviewed.  Patient is appropriate for refill of this medication.  -Patient informed no medication adjustments will be made to titrate up or add additional narcotics, benzodiazepines or other controlled substances to this regimen.  Referral to pain management or behavioral health will be made as appropriate.    Patient understands this prescription is a controlled substance which is potentially habit-forming and its use is regulated by the [employer]. We also discussed the new \"black box\" warning regarding the lethal combination of opioids and benzodiazepines. Refills are subject to terms of a controlled substance agreement and patient has an updated one on file. Most recent UDS is appropriate. Any refill requires an office visit. Narcotics, benzodiazepines, stimulants, and sleep aids may have adverse effects and the risks of " addiction, accidental overdose and death were emphasized. Provided prescriptions for the next three months.           Relevant Medications    zolpidem (AMBIEN) 5 MG Tab    Other Relevant Orders    Controlled Substance Treatment Agreement    Comp Metabolic Panel    CBC WITH DIFFERENTIAL    VITAMIN D,25 HYDROXY (DEFICIENCY)     Other Visit Diagnoses       Healthcare maintenance        Relevant Orders    Controlled Substance Treatment Agreement    Pain Management Screen    Comp Metabolic Panel    Lipid Profile    CBC WITH DIFFERENTIAL    VITAMIN D,25 HYDROXY (DEFICIENCY)    Therapeutic drug monitoring        Relevant Orders    Pain Management Screen    Encounter for vitamin deficiency screening        Relevant Orders    VITAMIN D,25 HYDROXY (DEFICIENCY)            Differential diagnosis, natural history, supportive care, and indications for immediate follow-up discussed.  Shared decision making approach utilized, and patient is amendable with plan of care.  Patient understands to return to clinic or go to the emergency department if symptoms worsen. All questions and concerns addressed to the best of my knowledge.    Return if symptoms worsen or fail to improve.    Please note that this dictation was created using voice recognition software. I have made every reasonable attempt to correct obvious errors, but I expect that there are errors of grammar and possibly content that I did not discover before finalizing the note.

## 2024-03-23 LAB
1OH-MIDAZOLAM UR QL SCN: NOT DETECTED
6MAM UR QL: NOT DETECTED
7AMINOCLONAZEPAM UR QL: NOT DETECTED
A-OH ALPRAZ UR QL: NOT DETECTED
ALPRAZ UR QL: NOT DETECTED
AMPHET UR QL SCN: NOT DETECTED
ANNOTATION COMMENT IMP: NORMAL
BARBITURATES UR QL: NEGATIVE
BUPRENORPHINE UR QL: NOT DETECTED
BZE UR QL: NEGATIVE
CARBOXYTHC UR QL: NEGATIVE
CARISOPRODOL UR QL: NEGATIVE
CLONAZEPAM UR QL: NOT DETECTED
CODEINE UR QL: NOT DETECTED
CREAT UR-MCNC: 51.2 MG/DL (ref 20–400)
DIAZEPAM UR QL: NOT DETECTED
ETHYL GLUCURONIDE UR QL: NORMAL
FENTANYL UR QL: NOT DETECTED
GABAPENTIN UR QL CFM: NOT DETECTED
HYDROCODONE UR QL: NOT DETECTED
HYDROMORPHONE UR QL: NOT DETECTED
LORAZEPAM UR QL: NOT DETECTED
MDA UR QL: NOT DETECTED
MDEA UR QL: NOT DETECTED
MDMA UR QL: NOT DETECTED
ME-PHENIDATE UR QL: NOT DETECTED
METHADONE UR QL: NEGATIVE
METHAMPHET UR QL: NOT DETECTED
MIDAZOLAM UR QL SCN: NOT DETECTED
MORPHINE UR QL: NOT DETECTED
NALOXONE UR QL CFM: NOT DETECTED
NORBUPRENORPHINE UR QL CFM: NOT DETECTED
NORDIAZEPAM UR QL: NOT DETECTED
NORFENTANYL UR QL: NOT DETECTED
NORHYDROCODONE UR QL CFM: NOT DETECTED
NORMEPERIDINE UR QL CFM: NOT DETECTED
NOROXYCODONE UR QL CFM: NOT DETECTED
NOROXYMORPHONE UR QL SCN: NOT DETECTED
OXAZEPAM UR QL: NOT DETECTED
OXYCODONE UR QL: NOT DETECTED
OXYMORPHONE UR QL: NOT DETECTED
PATHOLOGY STUDY: NORMAL
PCP UR QL: NEGATIVE
PHENTERMINE UR QL: NOT DETECTED
PREGABALIN UR QL CFM: NOT DETECTED
SERVICE CMNT-IMP: NORMAL
TAPENTADOL UR QL SCN: NOT DETECTED
TAPENTADOL UR QL SCN: NOT DETECTED
TEMAZEPAM UR QL: NOT DETECTED
TRAMADOL UR QL: NEGATIVE
ZOLPIDEM PHENYL-4-CARB UR QL SCN: NOT DETECTED
ZOLPIDEM UR QL: NOT DETECTED

## 2024-03-25 DIAGNOSIS — F51.09 SITUATIONAL INSOMNIA: ICD-10-CM

## 2024-03-25 RX ORDER — ZOLPIDEM TARTRATE 5 MG/1
5 TABLET ORAL NIGHTLY PRN
Qty: 30 TABLET | Refills: 0 | Status: SHIPPED | OUTPATIENT
Start: 2024-03-25 | End: 2024-04-24

## 2024-03-25 NOTE — TELEPHONE ENCOUNTER
Received request via: Pharmacy    Was the patient seen in the last year in this department? Yes    Does the patient have an active prescription (recently filled or refills available) for medication(s) requested? No    [facility]arm[facility]: [facility]    Does the patient have alf Plus and need 100 day supply (blood pressure, diabetes and cholesterol meds only)? Medication is not for cholesterol, blood pressure or diabetes

## 2024-06-04 ENCOUNTER — APPOINTMENT (OUTPATIENT)
Dept: RADIOLOGY | Facility: MEDICAL CENTER | Age: 39
End: 2024-06-04
Payer: COMMERCIAL

## 2024-06-04 DIAGNOSIS — Z12.31 VISIT FOR SCREENING MAMMOGRAM: ICD-10-CM

## 2024-07-15 ENCOUNTER — HOSPITAL ENCOUNTER (OUTPATIENT)
Dept: RADIOLOGY | Facility: MEDICAL CENTER | Age: 39
End: 2024-07-15
Payer: COMMERCIAL

## 2024-07-15 DIAGNOSIS — R92.8 ABNORMAL MAMMOGRAM: ICD-10-CM

## 2024-07-15 PROCEDURE — G0279 TOMOSYNTHESIS, MAMMO: HCPCS

## 2024-07-18 ENCOUNTER — HOSPITAL ENCOUNTER (OUTPATIENT)
Dept: LAB | Facility: MEDICAL CENTER | Age: 39
End: 2024-07-18
Payer: COMMERCIAL

## 2024-07-18 DIAGNOSIS — Z13.21 ENCOUNTER FOR VITAMIN DEFICIENCY SCREENING: ICD-10-CM

## 2024-07-18 DIAGNOSIS — F51.09 SITUATIONAL INSOMNIA: ICD-10-CM

## 2024-07-18 DIAGNOSIS — Z00.00 HEALTHCARE MAINTENANCE: ICD-10-CM

## 2024-07-18 DIAGNOSIS — I73.00 RAYNAUD'S DISEASE WITHOUT GANGRENE: ICD-10-CM

## 2024-07-18 LAB
25(OH)D3 SERPL-MCNC: 29 NG/ML (ref 30–100)
ALBUMIN SERPL BCP-MCNC: 4.2 G/DL (ref 3.2–4.9)
ALBUMIN/GLOB SERPL: 1.8 G/DL
ALP SERPL-CCNC: 45 U/L (ref 30–99)
ALT SERPL-CCNC: 11 U/L (ref 2–50)
ANION GAP SERPL CALC-SCNC: 9 MMOL/L (ref 7–16)
AST SERPL-CCNC: 14 U/L (ref 12–45)
BASOPHILS # BLD AUTO: 0.6 % (ref 0–1.8)
BASOPHILS # BLD: 0.06 K/UL (ref 0–0.12)
BILIRUB SERPL-MCNC: 0.4 MG/DL (ref 0.1–1.5)
BUN SERPL-MCNC: 11 MG/DL (ref 8–22)
CALCIUM ALBUM COR SERPL-MCNC: 9.5 MG/DL (ref 8.5–10.5)
CALCIUM SERPL-MCNC: 9.7 MG/DL (ref 8.5–10.5)
CHLORIDE SERPL-SCNC: 106 MMOL/L (ref 96–112)
CHOLEST SERPL-MCNC: 147 MG/DL (ref 100–199)
CO2 SERPL-SCNC: 24 MMOL/L (ref 20–33)
CREAT SERPL-MCNC: 0.84 MG/DL (ref 0.5–1.4)
EOSINOPHIL # BLD AUTO: 0.29 K/UL (ref 0–0.51)
EOSINOPHIL NFR BLD: 3.1 % (ref 0–6.9)
ERYTHROCYTE [DISTWIDTH] IN BLOOD BY AUTOMATED COUNT: 45.2 FL (ref 35.9–50)
GFR SERPLBLD CREATININE-BSD FMLA CKD-EPI: 89 ML/MIN/1.73 M 2
GLOBULIN SER CALC-MCNC: 2.3 G/DL (ref 1.9–3.5)
GLUCOSE SERPL-MCNC: 92 MG/DL (ref 65–99)
HCT VFR BLD AUTO: 39.1 % (ref 37–47)
HDLC SERPL-MCNC: 74 MG/DL
HGB BLD-MCNC: 13.3 G/DL (ref 12–16)
IMM GRANULOCYTES # BLD AUTO: 0.03 K/UL (ref 0–0.11)
IMM GRANULOCYTES NFR BLD AUTO: 0.3 % (ref 0–0.9)
LDLC SERPL CALC-MCNC: 66 MG/DL
LYMPHOCYTES # BLD AUTO: 1.91 K/UL (ref 1–4.8)
LYMPHOCYTES NFR BLD: 20.1 % (ref 22–41)
MCH RBC QN AUTO: 31.6 PG (ref 27–33)
MCHC RBC AUTO-ENTMCNC: 34 G/DL (ref 32.2–35.5)
MCV RBC AUTO: 92.9 FL (ref 81.4–97.8)
MONOCYTES # BLD AUTO: 0.77 K/UL (ref 0–0.85)
MONOCYTES NFR BLD AUTO: 8.1 % (ref 0–13.4)
NEUTROPHILS # BLD AUTO: 6.43 K/UL (ref 1.82–7.42)
NEUTROPHILS NFR BLD: 67.8 % (ref 44–72)
NRBC # BLD AUTO: 0 K/UL
NRBC BLD-RTO: 0 /100 WBC (ref 0–0.2)
PLATELET # BLD AUTO: 188 K/UL (ref 164–446)
PMV BLD AUTO: 11.9 FL (ref 9–12.9)
POTASSIUM SERPL-SCNC: 5.5 MMOL/L (ref 3.6–5.5)
PROT SERPL-MCNC: 6.5 G/DL (ref 6–8.2)
RBC # BLD AUTO: 4.21 M/UL (ref 4.2–5.4)
SODIUM SERPL-SCNC: 139 MMOL/L (ref 135–145)
TRIGL SERPL-MCNC: 33 MG/DL (ref 0–149)
WBC # BLD AUTO: 9.5 K/UL (ref 4.8–10.8)

## 2024-07-18 PROCEDURE — 82306 VITAMIN D 25 HYDROXY: CPT

## 2024-07-18 PROCEDURE — 85025 COMPLETE CBC W/AUTO DIFF WBC: CPT

## 2024-07-18 PROCEDURE — 80061 LIPID PANEL: CPT

## 2024-07-18 PROCEDURE — 80053 COMPREHEN METABOLIC PANEL: CPT

## 2024-07-18 PROCEDURE — 36415 COLL VENOUS BLD VENIPUNCTURE: CPT

## 2024-11-27 ENCOUNTER — TELEMEDICINE (OUTPATIENT)
Dept: MEDICAL GROUP | Facility: PHYSICIAN GROUP | Age: 39
End: 2024-11-27
Payer: COMMERCIAL

## 2024-11-27 VITALS — WEIGHT: 138 LBS | BODY MASS INDEX: 20.44 KG/M2 | HEIGHT: 69 IN

## 2024-11-27 DIAGNOSIS — F51.09 SITUATIONAL INSOMNIA: ICD-10-CM

## 2024-11-27 RX ORDER — ZOLPIDEM TARTRATE 5 MG/1
5 TABLET ORAL NIGHTLY PRN
Qty: 30 TABLET | Refills: 0 | Status: SHIPPED | OUTPATIENT
Start: 2024-11-27 | End: 2024-12-27

## 2024-11-27 ASSESSMENT — FIBROSIS 4 INDEX: FIB4 SCORE: 0.92

## 2024-11-27 NOTE — PROGRESS NOTES
"Virtual Visit: Established Patient   This visit was conducted via Teams using secure and encrypted videoconferencing technology.   The patient was in their home in the [facility].    The patient's identity was confirmed and verbal consent was obtained for this virtual visit.    Subjective:   CC:   Chief Complaint   Patient presents with    Medication Refill     Ambien     Gogo Wong is a 39 y.o. female presenting for evaluation and management of:    History of Present Illness  The patient presents via virtual visit for medication refill.    She reports no significant changes since her last consultation. She is seeking a refill of her Ambien 5 mg prescription, which she uses to manage her sleep disturbances caused by her irregular work schedule. She also requests a refill of her pantoprazole prescription, as she is nearing the end of her current supply. She mentions that she rarely uses propranolol.        ROS   Neg ROS    Current medicines (including changes today)  Current Outpatient Medications   Medication Sig Dispense Refill    zolpidem (AMBIEN) 5 MG Tab Take 1 Tablet by mouth at bedtime as needed for Sleep for up to 30 days. 30 Tablet 0    pantoprazole (PROTONIX) 40 MG Tablet Delayed Response Take 1 Tablet by mouth every day. 90 Tablet 3    Melatonin 1 MG/4ML Liquid       propranolol (INDERAL) 20 MG Tab Take 1 Tab by mouth 1 time daily as needed. 60 Tab 0    ibuprofen (MOTRIN) 600 MG Tab Take 600 mg by mouth every 6 hours as needed.       No current facility-administered medications for this visit.       Patient Active Problem List    Diagnosis Date Noted    Situational insomnia 12/30/2021    Bradycardia, sinus 03/15/2017    Patellofemoral syndrome 10/06/2015    GERD (gastroesophageal reflux disease) 10/06/2015    Performance anxiety 10/06/2015    Raynaud's disease 10/06/2015    IBS (irritable bowel syndrome) 10/06/2015        Objective:   Ht 1.753 m (5' 9\")   Wt 62.6 kg (138 lb)   BMI 20.38 kg/m² "     Physical Exam:  Constitutional: Alert, no distress, well-groomed.  Skin: No rashes in visible areas.  Eye: Round. Conjunctiva clear, lids normal. No icterus.   ENMT: Lips pink without lesions, good dentition, moist mucous membranes. Phonation normal.  Neck: No masses, no thyromegaly. Moves freely without pain.  Respiratory: Unlabored respiratory effort, no cough or audible wheeze  Psych: Alert and oriented x3, normal affect and mood.     Assessment and Plan:   The following treatment plan was discussed:     1. Situational insomnia  - zolpidem (AMBIEN) 5 MG Tab; Take 1 Tablet by mouth at bedtime as needed for Sleep for up to 30 days.  Dispense: 30 Tablet; Refill: 0    Assessment & Plan  1. Insomnia.  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  Controlled substance visit today for Insomnia.  Patient has been stable on Ambien 5 mg if needed for shift work induced insomnia.   shows no abnormal prescribing or filling behavior.  Controlled substance agreement up-to-date.  Urine drug screen up-to-date.  - Ambien 5 mg refilled, risk benefits and side effects of this medication discussed   -Urine toxicology screen obtained in clinic  -Controlled substance abuse agreement contract signed and scanned into patient's chart  -Controlled substance discussed with client. Client agrees to abide by controlled substance contract.   -A Risk of Abuse assessment for opioid abuse was previously preformed and documented in the past medical history. The treatment plan was reviewed and discussed with the patient. The pharmacy monitoring report was requested and reviewed.  Patient is appropriate for refill of this medication.  -Patient informed no medication adjustments will be made to titrate up or add additional narcotics, benzodiazepines or other controlled substances to this regimen.  Referral to pain management or behavioral health will be made as  "appropriate.    Patient understands this prescription is a controlled substance which is potentially habit-forming and its use is regulated by the [employer]. We also discussed the new \"black box\" warning regarding the lethal combination of opioids and benzodiazepines. Refills are subject to terms of a controlled substance agreement and patient has an updated one on file. Most recent UDS is appropriate. Any refill requires an office visit. Narcotics, benzodiazepines, stimulants, and sleep aids may have adverse effects and the risks of addiction, accidental overdose and death were emphasized. Provided prescriptions for the next three months.        2. Medication Management.  She was advised to contact when she requires a refill of her pantoprazole.    3. Health Maintenance.  A note was made to order a mammogram in June 2025. She is advised to send a note when ready to get the mammogram.          Follow-up: Return if symptoms worsen or fail to improve.         "

## 2025-07-16 ENCOUNTER — APPOINTMENT (OUTPATIENT)
Dept: RADIOLOGY | Facility: MEDICAL CENTER | Age: 40
End: 2025-07-16
Payer: COMMERCIAL

## 2025-07-16 DIAGNOSIS — Z12.31 VISIT FOR SCREENING MAMMOGRAM: ICD-10-CM

## 2025-07-16 PROCEDURE — 77063 BREAST TOMOSYNTHESIS BI: CPT

## 2025-07-18 ENCOUNTER — RESULTS FOLLOW-UP (OUTPATIENT)
Dept: MEDICAL GROUP | Facility: PHYSICIAN GROUP | Age: 40
End: 2025-07-18
Payer: COMMERCIAL